# Patient Record
Sex: MALE | Race: WHITE | HISPANIC OR LATINO | Employment: FULL TIME | URBAN - METROPOLITAN AREA
[De-identification: names, ages, dates, MRNs, and addresses within clinical notes are randomized per-mention and may not be internally consistent; named-entity substitution may affect disease eponyms.]

---

## 2019-03-27 ENCOUNTER — OFFICE VISIT CONVERTED (OUTPATIENT)
Dept: UROLOGY | Facility: CLINIC | Age: 34
End: 2019-03-27
Attending: UROLOGY

## 2019-04-19 ENCOUNTER — PROCEDURE VISIT CONVERTED (OUTPATIENT)
Dept: UROLOGY | Facility: CLINIC | Age: 34
End: 2019-04-19
Attending: UROLOGY

## 2019-04-24 ENCOUNTER — OFFICE VISIT CONVERTED (OUTPATIENT)
Dept: UROLOGY | Facility: CLINIC | Age: 34
End: 2019-04-24
Attending: UROLOGY

## 2019-07-02 ENCOUNTER — HOSPITAL ENCOUNTER (OUTPATIENT)
Dept: URGENT CARE | Facility: CLINIC | Age: 34
Discharge: HOME | End: 2019-07-02

## 2019-08-21 ENCOUNTER — OFFICE VISIT CONVERTED (OUTPATIENT)
Dept: UROLOGY | Facility: CLINIC | Age: 34
End: 2019-08-21
Attending: UROLOGY

## 2019-09-11 ENCOUNTER — OFFICE VISIT CONVERTED (OUTPATIENT)
Dept: INTERNAL MEDICINE | Facility: CLINIC | Age: 34
End: 2019-09-11
Attending: NURSE PRACTITIONER

## 2019-09-11 ENCOUNTER — HOSPITAL ENCOUNTER (OUTPATIENT)
Dept: OTHER | Facility: HOSPITAL | Age: 34
Discharge: HOME | End: 2019-09-11
Attending: NURSE PRACTITIONER

## 2019-09-11 LAB
ALBUMIN SERPL-MCNC: 4.4 G/DL (ref 3.5–5)
ALBUMIN/GLOB SERPL: 1.8 {RATIO} (ref 1.4–2.6)
ALP SERPL-CCNC: 62 U/L (ref 53–128)
ALT SERPL-CCNC: 44 U/L (ref 10–40)
ANION GAP SERPL CALC-SCNC: 19 MMOL/L (ref 8–19)
AST SERPL-CCNC: 23 U/L (ref 15–50)
BASOPHILS # BLD AUTO: 0.05 10*3/UL (ref 0–0.2)
BASOPHILS NFR BLD AUTO: 0.8 % (ref 0–3)
BILIRUB SERPL-MCNC: 0.75 MG/DL (ref 0.2–1.3)
BUN SERPL-MCNC: 16 MG/DL (ref 5–25)
BUN/CREAT SERPL: 18 {RATIO} (ref 6–20)
CALCIUM SERPL-MCNC: 9.1 MG/DL (ref 8.7–10.4)
CHLORIDE SERPL-SCNC: 100 MMOL/L (ref 99–111)
CHOLEST SERPL-MCNC: 130 MG/DL (ref 107–200)
CHOLEST/HDLC SERPL: 6.2 {RATIO} (ref 3–6)
CONV ABS IMM GRAN: 0.03 10*3/UL (ref 0–0.2)
CONV CO2: 24 MMOL/L (ref 22–32)
CONV IMMATURE GRAN: 0.5 % (ref 0–1.8)
CONV TOTAL PROTEIN: 6.9 G/DL (ref 6.3–8.2)
CREAT UR-MCNC: 0.89 MG/DL (ref 0.7–1.2)
DEPRECATED RDW RBC AUTO: 38.2 FL (ref 35.1–43.9)
EOSINOPHIL # BLD AUTO: 0.08 10*3/UL (ref 0–0.7)
EOSINOPHIL # BLD AUTO: 1.3 % (ref 0–7)
ERYTHROCYTE [DISTWIDTH] IN BLOOD BY AUTOMATED COUNT: 12.2 % (ref 11.6–14.4)
EST. AVERAGE GLUCOSE BLD GHB EST-MCNC: 237 MG/DL
GFR SERPLBLD BASED ON 1.73 SQ M-ARVRAT: >60 ML/MIN/{1.73_M2}
GLOBULIN UR ELPH-MCNC: 2.5 G/DL (ref 2–3.5)
GLUCOSE SERPL-MCNC: 222 MG/DL (ref 70–99)
HBA1C MFR BLD: 9.9 % (ref 3.5–5.7)
HCT VFR BLD AUTO: 46.6 % (ref 42–52)
HDLC SERPL-MCNC: 21 MG/DL (ref 40–60)
HGB BLD-MCNC: 15.7 G/DL (ref 14–18)
LDLC SERPL CALC-MCNC: 45 MG/DL (ref 70–100)
LYMPHOCYTES # BLD AUTO: 1.56 10*3/UL (ref 1–5)
LYMPHOCYTES NFR BLD AUTO: 25.2 % (ref 20–45)
MCH RBC QN AUTO: 29 PG (ref 27–31)
MCHC RBC AUTO-ENTMCNC: 33.7 G/DL (ref 33–37)
MCV RBC AUTO: 86 FL (ref 80–96)
MONOCYTES # BLD AUTO: 0.39 10*3/UL (ref 0.2–1.2)
MONOCYTES NFR BLD AUTO: 6.3 % (ref 3–10)
NEUTROPHILS # BLD AUTO: 4.09 10*3/UL (ref 2–8)
NEUTROPHILS NFR BLD AUTO: 65.9 % (ref 30–85)
NRBC CBCN: 0 % (ref 0–0.7)
OSMOLALITY SERPL CALC.SUM OF ELEC: 296 MOSM/KG (ref 273–304)
PLATELET # BLD AUTO: 197 10*3/UL (ref 130–400)
PMV BLD AUTO: 11.7 FL (ref 9.4–12.4)
POTASSIUM SERPL-SCNC: 4.1 MMOL/L (ref 3.5–5.3)
RBC # BLD AUTO: 5.42 10*6/UL (ref 4.7–6.1)
SODIUM SERPL-SCNC: 139 MMOL/L (ref 135–147)
TRIGL SERPL-MCNC: 320 MG/DL (ref 40–150)
TSH SERPL-ACNC: 2.6 M[IU]/L (ref 0.27–4.2)
VLDLC SERPL-MCNC: 64 MG/DL (ref 5–37)
WBC # BLD AUTO: 6.2 10*3/UL (ref 4.8–10.8)

## 2020-01-21 ENCOUNTER — HOSPITAL ENCOUNTER (OUTPATIENT)
Dept: URGENT CARE | Facility: CLINIC | Age: 35
Discharge: HOME | End: 2020-01-21

## 2020-02-18 ENCOUNTER — HOSPITAL ENCOUNTER (OUTPATIENT)
Dept: URGENT CARE | Facility: CLINIC | Age: 35
Discharge: HOME | End: 2020-02-18
Attending: EMERGENCY MEDICINE

## 2020-02-20 LAB — BACTERIA SPEC AEROBE CULT: NORMAL

## 2020-04-17 ENCOUNTER — TELEMEDICINE CONVERTED (OUTPATIENT)
Dept: INTERNAL MEDICINE | Facility: CLINIC | Age: 35
End: 2020-04-17
Attending: NURSE PRACTITIONER

## 2020-08-14 ENCOUNTER — HOSPITAL ENCOUNTER (OUTPATIENT)
Dept: OTHER | Facility: HOSPITAL | Age: 35
Discharge: HOME | End: 2020-08-14
Attending: NURSE PRACTITIONER

## 2020-08-14 ENCOUNTER — OFFICE VISIT CONVERTED (OUTPATIENT)
Dept: INTERNAL MEDICINE | Facility: CLINIC | Age: 35
End: 2020-08-14
Attending: PHYSICIAN ASSISTANT

## 2020-08-14 LAB
ALBUMIN SERPL-MCNC: 4.9 G/DL (ref 3.5–5)
ALBUMIN/GLOB SERPL: 1.9 {RATIO} (ref 1.4–2.6)
ALP SERPL-CCNC: 66 U/L (ref 53–128)
ALT SERPL-CCNC: 40 U/L (ref 10–40)
ANION GAP SERPL CALC-SCNC: 22 MMOL/L (ref 8–19)
AST SERPL-CCNC: 22 U/L (ref 15–50)
BASOPHILS # BLD AUTO: 0.07 10*3/UL (ref 0–0.2)
BASOPHILS NFR BLD AUTO: 1 % (ref 0–3)
BILIRUB SERPL-MCNC: 0.84 MG/DL (ref 0.2–1.3)
BUN SERPL-MCNC: 14 MG/DL (ref 5–25)
BUN/CREAT SERPL: 13 {RATIO} (ref 6–20)
CALCIUM SERPL-MCNC: 10.4 MG/DL (ref 8.7–10.4)
CHLORIDE SERPL-SCNC: 101 MMOL/L (ref 99–111)
CHOLEST SERPL-MCNC: 152 MG/DL (ref 107–200)
CHOLEST/HDLC SERPL: 6.3 {RATIO} (ref 3–6)
CONV ABS IMM GRAN: 0.03 10*3/UL (ref 0–0.2)
CONV CO2: 23 MMOL/L (ref 22–32)
CONV IMMATURE GRAN: 0.4 % (ref 0–1.8)
CONV TOTAL PROTEIN: 7.5 G/DL (ref 6.3–8.2)
CREAT UR-MCNC: 1.08 MG/DL (ref 0.7–1.2)
DEPRECATED RDW RBC AUTO: 41.1 FL (ref 35.1–43.9)
EOSINOPHIL # BLD AUTO: 0.09 10*3/UL (ref 0–0.7)
EOSINOPHIL # BLD AUTO: 1.2 % (ref 0–7)
ERYTHROCYTE [DISTWIDTH] IN BLOOD BY AUTOMATED COUNT: 12.7 % (ref 11.6–14.4)
EST. AVERAGE GLUCOSE BLD GHB EST-MCNC: 194 MG/DL
GFR SERPLBLD BASED ON 1.73 SQ M-ARVRAT: >60 ML/MIN/{1.73_M2}
GLOBULIN UR ELPH-MCNC: 2.6 G/DL (ref 2–3.5)
GLUCOSE SERPL-MCNC: 139 MG/DL (ref 70–99)
HBA1C MFR BLD: 8.4 % (ref 3.5–5.7)
HCT VFR BLD AUTO: 53.7 % (ref 42–52)
HDLC SERPL-MCNC: 24 MG/DL (ref 40–60)
HGB BLD-MCNC: 17.7 G/DL (ref 14–18)
LDLC SERPL CALC-MCNC: 92 MG/DL (ref 70–100)
LYMPHOCYTES # BLD AUTO: 1.99 10*3/UL (ref 1–5)
LYMPHOCYTES NFR BLD AUTO: 27.1 % (ref 20–45)
MCH RBC QN AUTO: 29.3 PG (ref 27–31)
MCHC RBC AUTO-ENTMCNC: 33 G/DL (ref 33–37)
MCV RBC AUTO: 88.8 FL (ref 80–96)
MONOCYTES # BLD AUTO: 0.43 10*3/UL (ref 0.2–1.2)
MONOCYTES NFR BLD AUTO: 5.9 % (ref 3–10)
NEUTROPHILS # BLD AUTO: 4.74 10*3/UL (ref 2–8)
NEUTROPHILS NFR BLD AUTO: 64.4 % (ref 30–85)
NRBC CBCN: 0 % (ref 0–0.7)
OSMOLALITY SERPL CALC.SUM OF ELEC: 295 MOSM/KG (ref 273–304)
PLATELET # BLD AUTO: 224 10*3/UL (ref 130–400)
PMV BLD AUTO: 11.7 FL (ref 9.4–12.4)
POTASSIUM SERPL-SCNC: 4.8 MMOL/L (ref 3.5–5.3)
RBC # BLD AUTO: 6.05 10*6/UL (ref 4.7–6.1)
SODIUM SERPL-SCNC: 141 MMOL/L (ref 135–147)
TRIGL SERPL-MCNC: 178 MG/DL (ref 40–150)
VLDLC SERPL-MCNC: 36 MG/DL (ref 5–37)
WBC # BLD AUTO: 7.35 10*3/UL (ref 4.8–10.8)

## 2021-03-30 ENCOUNTER — HOSPITAL ENCOUNTER (OUTPATIENT)
Dept: OTHER | Facility: HOSPITAL | Age: 36
Discharge: HOME | End: 2021-03-30
Attending: NURSE PRACTITIONER

## 2021-03-30 ENCOUNTER — CONVERSION ENCOUNTER (OUTPATIENT)
Dept: INTERNAL MEDICINE | Facility: CLINIC | Age: 36
End: 2021-03-30

## 2021-03-30 ENCOUNTER — OFFICE VISIT CONVERTED (OUTPATIENT)
Dept: INTERNAL MEDICINE | Facility: CLINIC | Age: 36
End: 2021-03-30
Attending: NURSE PRACTITIONER

## 2021-03-30 LAB
ALBUMIN SERPL-MCNC: 4.6 G/DL (ref 3.5–5)
ALBUMIN/GLOB SERPL: 1.6 {RATIO} (ref 1.4–2.6)
ALP SERPL-CCNC: 76 U/L (ref 53–128)
ALT SERPL-CCNC: 61 U/L (ref 10–40)
ANION GAP SERPL CALC-SCNC: 19 MMOL/L (ref 8–19)
AST SERPL-CCNC: 28 U/L (ref 15–50)
BASOPHILS # BLD AUTO: 0.08 10*3/UL (ref 0–0.2)
BASOPHILS NFR BLD AUTO: 1.1 % (ref 0–3)
BILIRUB SERPL-MCNC: 0.79 MG/DL (ref 0.2–1.3)
BUN SERPL-MCNC: 11 MG/DL (ref 5–25)
BUN/CREAT SERPL: 12 {RATIO} (ref 6–20)
CALCIUM SERPL-MCNC: 9.6 MG/DL (ref 8.7–10.4)
CHLORIDE SERPL-SCNC: 97 MMOL/L (ref 99–111)
CHOLEST SERPL-MCNC: 175 MG/DL (ref 107–200)
CHOLEST/HDLC SERPL: 7.3 {RATIO} (ref 3–6)
CONV ABS IMM GRAN: 0.04 10*3/UL (ref 0–0.2)
CONV CO2: 25 MMOL/L (ref 22–32)
CONV IMMATURE GRAN: 0.6 % (ref 0–1.8)
CONV TOTAL PROTEIN: 7.5 G/DL (ref 6.3–8.2)
CREAT UR-MCNC: 0.9 MG/DL (ref 0.7–1.2)
DEPRECATED RDW RBC AUTO: 40 FL (ref 35.1–43.9)
EOSINOPHIL # BLD AUTO: 0.07 10*3/UL (ref 0–0.7)
EOSINOPHIL # BLD AUTO: 1 % (ref 0–7)
ERYTHROCYTE [DISTWIDTH] IN BLOOD BY AUTOMATED COUNT: 12.8 % (ref 11.6–14.4)
EST. AVERAGE GLUCOSE BLD GHB EST-MCNC: 232 MG/DL
GFR SERPLBLD BASED ON 1.73 SQ M-ARVRAT: >60 ML/MIN/{1.73_M2}
GLOBULIN UR ELPH-MCNC: 2.9 G/DL (ref 2–3.5)
GLUCOSE SERPL-MCNC: 227 MG/DL (ref 70–99)
HBA1C MFR BLD: 9.7 % (ref 3.5–5.7)
HCT VFR BLD AUTO: 48.3 % (ref 42–52)
HDLC SERPL-MCNC: 24 MG/DL (ref 40–60)
HGB BLD-MCNC: 16.3 G/DL (ref 14–18)
LDLC SERPL CALC-MCNC: 89 MG/DL (ref 70–100)
LYMPHOCYTES # BLD AUTO: 1.8 10*3/UL (ref 1–5)
LYMPHOCYTES NFR BLD AUTO: 25.8 % (ref 20–45)
MCH RBC QN AUTO: 29.2 PG (ref 27–31)
MCHC RBC AUTO-ENTMCNC: 33.7 G/DL (ref 33–37)
MCV RBC AUTO: 86.4 FL (ref 80–96)
MONOCYTES # BLD AUTO: 0.41 10*3/UL (ref 0.2–1.2)
MONOCYTES NFR BLD AUTO: 5.9 % (ref 3–10)
NEUTROPHILS # BLD AUTO: 4.58 10*3/UL (ref 2–8)
NEUTROPHILS NFR BLD AUTO: 65.6 % (ref 30–85)
NRBC CBCN: 0 % (ref 0–0.7)
OSMOLALITY SERPL CALC.SUM OF ELEC: 291 MOSM/KG (ref 273–304)
PLATELET # BLD AUTO: 222 10*3/UL (ref 130–400)
PMV BLD AUTO: 11.9 FL (ref 9.4–12.4)
POTASSIUM SERPL-SCNC: 4.2 MMOL/L (ref 3.5–5.3)
RBC # BLD AUTO: 5.59 10*6/UL (ref 4.7–6.1)
SODIUM SERPL-SCNC: 137 MMOL/L (ref 135–147)
T4 FREE SERPL-MCNC: 1.4 NG/DL (ref 0.9–1.8)
TRIGL SERPL-MCNC: 452 MG/DL (ref 40–150)
TSH SERPL-ACNC: 2.29 M[IU]/L (ref 0.27–4.2)
WBC # BLD AUTO: 6.98 10*3/UL (ref 4.8–10.8)

## 2021-03-31 LAB
CONV CREATININE URINE, RANDOM: 216.1 MG/DL (ref 10–300)
CONV MICROALBUM.,U,RANDOM: 62.6 MG/L (ref 0–20)
MICROALBUMIN/CREAT UR: 29 MG/G{CRE} (ref 0–25)

## 2021-05-12 NOTE — PROGRESS NOTES
Progress Note      Patient Name: Matt Albarran   Patient ID: [id number]   Sex: Male   YOB: 1985    Primary Care Provider: Tierra BALLARD   Referring Provider: Tierra BALLARD    Visit Date: April 17, 2020    Provider: ELIO Berger   Location: [facility]   Location Address: [address] [place] [address], [address]  [place], [place]  [id number]   Location Phone: [phone]          History Of Present Illness  Video Conferencing Visit  Matt Albarran is a 34 year old /White,  or  male who is presenting for evaluation via video conferencing. Verbal consent obtained before beginning visit.   The following staff were present during this visit: Jose M Soriano MA; ELIO Berger   Matt Albarran is a 34 year old /White,  or  male who presents for evaluation and treatment of:      Informed patient that as visit is being performed as a video conference there will be no opportunity to obtain vital signs or perform a thorough physical exam. Due to this there is unfortunately a possibility that things may be missed that would typically be noticed during a traditional visit. Patient is aware of this possibility and agrees to proceed with the video conference. Patient states there is no other person present for this video conference. Call via Zoom.    Video conference started: 0916  Video conference ended: 0926    DM2-  Patient is currently managing with metformin 1000 mg once daily and Jardiance. HgbA1c 9.2. He has not checked his blood glucose levels in some time. Patient did not follow up for two week visit as scheduled 9/2019. Reports previous blood glucose readings averaging around 130, however he has not been checking in some time. Denies lows. Urine microalbumin: 10/2019. Diabetic foot exam: Due. Diabetic eye exam: Due.     HLD-  Managed with statin, well tolerated. Denies leg cramping. Most recent LDL 45.      HTN-  Managed with Lisinopril. Reports home blood pressure readings 130s/80s. Denies chest pain, blurry vision, headache, leg swelling.       Past Medical History  Disease Name Date Onset Notes   Diabetes --  --    High blood pressure --  --    High cholesterol --  --          Past Surgical History  Procedure Name Date Notes   Vasectomy 2019 --          Medication List  Name Date Started Instructions   atorvastatin 40 mg oral tablet 03/13/2020 TAKE 1 TABLET BY MOUTH ONCE DAILY FOR 90 DAYS   blood glucose meter 09/12/2019 use as directed to check blood sugar when fasting, 2 hours before meals and after each meal.   GenStrip Test Strip miscellaneous strip 09/12/2019 use as directed to check blood sugar fasting, 2 hours before meals and after each meal   Jardiance 10 mg oral tablet 03/13/2020 TAKE 1 TABLET BY MOUTH ONCE DAILY IN THE MORNING   lancets 21 gauge miscellaneous misc 09/12/2019 use as directed to check blood sugar   lisinopril 20 mg oral tablet 03/13/2020 TAKE 1 TABLET BY MOUTH ONCE DAILY FOR 90 DAYS   metformin 1,000 mg oral tablet 09/12/2019 take 1 tablet (1,000 mg) by oral route 2 times per day with morning and evening meals for 90 days   True Metrix Glucose Test Strip miscellaneous strip 09/17/2019 use as directed         Allergy List  Allergen Name Date Reaction Notes   NO KNOWN DRUG ALLERGIES --  --  --          Family Medical History  Disease Name Relative/Age Notes   Diabetes, unspecified type Father/  Grandmother (paternal)/   Father; Grandmother (paternal)         Social History  Finding Status Start/Stop Quantity Notes   Alcohol Former --/-- --  drinks in the past   Caffeine Current every day --/-- --  drinks regularly; coffee; 1-2 times per day   Second hand smoke exposure Never --/-- --  no   Tobacco Never --/-- --  --          Review of Systems  · Constitutional  o Denies  o : fever, fatigue, weight loss, weight gain  · Psychiatric  o Denies  o : anxiety, depression, suicidal ideation,  homicidal ideation      Physical Examination     General: Well nourished, no acute distress  HENT: Atraumatic, normocephalic  Eyes: Extraocular movements intact, no scleral icterus  Lungs: Breathing comfortably, without cough  Integumentary: No visible rash or lesion  Neurologic: Grossly oriented to person, place, time; without facial droop  Psych: Normal mood and affect               Assessment  · Diabetes mellitus, type 2     250.00/E11.9  Patient has not been seen in clinic x 7 months, discussed the importance of making it to follow up appointments. Will continue Jardiance and metformin at this time, however will likely need dosage increases on both. Labs ordered, patient to come to clinic early next week to have labs drawn. Will determine medication adjustments based on results of labs. Patient to monitor blood glucose levels at home and call if consistently elevated/low. Diabetic foot exam and urine microalbumin due at next in clinic appointment.   · Essential hypertension     401.9/I10  Well controlled, continue Lisinopril. Encouraged patient to continue to monitor blood pressure at home and to call or return to clinic with consistent elevations. Labs ordered.  · Hyperlipidemia     272.4/E78.5  Well controlled on previous labs, continue statin. Lipid panel ordered.    Problems Reconciled  Plan  · Orders  o CBC with Auto Diff [facility] ([id number]) - 250.00/E11.9 - 04/17/2020  o CMP [facility] ([id number]) - 250.00/E11.9 - 04/17/2020  o Hgb A1c [facility] ([id number]) - 250.00/E11.9 - 04/17/2020  o Lipid Panel [facility] ([id number]) - 250.00/E11.9 - 04/17/2020  o Urine microalbumin ([id number]) - 250.00/E11.9 - 04/17/2020  o ACO-39: Current medications updated and reviewed () - - 04/17/2020  · Medications  o Medications have been Reconciled  o Transition of Care or Provider Policy  · Instructions  o Continue blood sugar monitoring daily and record. Bring your log to office visits. Call the office for readings below 70 and above 250 or any  complications.  o Daily foot care. Avoid walking barefoot. Annual Dilated Eye Exam.  o Discussed with patient blood pressure monitoring, hemoglobin A1C levels need to be below 7.0, and LDL (Lipid) goals below 70.  o Patient advised to monitor blood pressure (B/P) at home and journal readings. Patient informed that a B/P reading at home of more than 130/80 is considered hypertension. For readings greater dron862/90 or higher patient is advised to follow up in the office with readings for management. Patient advised to limit sodium intake.  o Advised that cheeses and other sources of dairy fats, animal fats, fast food, and the extras (candy, pastries, pies, doughnuts and cookies) all contain LDL raising nutrients. Advised to increase fruits, vegetables, whole grains, and to monitor portion sizes.   o Take all medications as prescribed/directed.  o Patient was educated/instructed on their diagnosis, treatment and medications prior to discharge from the clinic today.  o Patient instructed to seek medical attention urgently for new or worsening symptoms.  o Call the office with any concerns or questions.  · Disposition  o Call or Return if symptoms worsen or persist.  o Follow up in 3 months  o Prescriptions sent to pharmacy  o Will call patient with results of labs            Electronically Signed by: ELIO Berger -Author on April 17, 2020 12:12:41 PM

## 2021-05-13 NOTE — PROGRESS NOTES
"   Progress Note      Patient Name: Matt Albarran   Patient ID: [id number]   Sex: Male   YOB: 1985    Primary Care Provider: Tierra BALLARD   Referring Provider: Tierra BALLARD    Visit Date: August 14, 2020    Provider: Corrine Okeefe PA-C   Location: [facility]   Location Address: [address] [place] [address] [address]  [place], [place]  [id number]   Location Phone: [phone]          Chief Complaint  · Knee pain      History Of Present Illness  Matt Albarran is a 34 year old /White,  or  male who presents for evaluation and treatment of:      left knee pain. Patient states this started on Monday. He came home from work and got up. He states when he started walking it started hurting. He states he feels it more when he is walking up and downstairs and when walking for prolonged periods of time. 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 He states he walks a lot at work, but did not hurt his knee at work that day. He states he has no history of prior knee injury or knee pain. He states he has not exercised for the past few months since COVID started. He has not been seen for this before. He states he went to work all week and had restricted ROM. He states his pain and ROM is gradually improving. He states he can point to exactly where the pain is. He states when he is walking he notices some weakness and \"giving\" while walking. He states he has tried a neoprene sleeve, Tylenol and icing to alleviate his symptoms. He states he has not found any of these things to work. Denies edema. Denies redness and ecchymosis. Denies numbness and tingling. Denies instability. Denies left hip pain. Denies left ankle pain.       Past Medical History  Disease Name Date Onset Notes   Diabetes --  --    High blood pressure --  --    High cholesterol --  --          Past Surgical History  Procedure Name Date Notes   Vasectomy 2019 --          Medication List  Name Date " Started Instructions   atorvastatin 40 mg oral tablet 06/18/2020 TAKE 1 TABLET BY MOUTH ONCE DAILY FOR 90 DAYS   blood glucose meter 09/12/2019 use as directed to check blood sugar when fasting, 2 hours before meals and after each meal.   GenStrip Test Strip miscellaneous strip 08/14/2020 use as directed to check blood sugar fasting, 2 hours before meals and after each meal   Jardiance 10 mg oral tablet 06/17/2020 TAKE 1 TABLET BY MOUTH ONCE DAILY IN THE MORNING   lancets 21 gauge miscellaneous misc 08/14/2020 use as directed to check blood sugar   lisinopril 20 mg oral tablet 06/18/2020 TAKE 1 TABLET BY MOUTH ONCE DAILY FOR 90 DAYS   metformin 1,000 mg oral tablet 09/12/2019 take 1 tablet (1,000 mg) by oral route 2 times per day with morning and evening meals for 90 days   True Metrix Glucose Test Strip miscellaneous strip 09/17/2019 use as directed         Allergy List  Allergen Name Date Reaction Notes   NO KNOWN DRUG ALLERGIES --  --  --        Allergies Reconciled  Family Medical History  Disease Name Relative/Age Notes   Diabetes, unspecified type Father/  Grandmother (paternal)/   Father; Grandmother (paternal)         Social History  Finding Status Start/Stop Quantity Notes   Alcohol Former --/-- --  drinks in the past   Caffeine Current every day --/-- --  drinks regularly; coffee; 1-2 times per day   Second hand smoke exposure Never --/-- --  no   Tobacco Never --/-- --  --          Review of Systems  · Constitutional  o Denies  o : fever, fatigue  · Cardiovascular  o Denies  o : chest pain, palpitations  · Respiratory  o Denies  o : shortness of breath, cough  · Gastrointestinal  o Denies  o : nausea, vomiting, diarrhea, constipation, abdominal pain  · Neurologic  o Denies  o : dizziness, loss of balance  · Musculoskeletal  o Admits  o : limitation of motion, knee pain  o Denies  o : joint swelling, muscle pain, muscular weakness, back pain, hip pain, ankle pain      Vitals  Date Time BP Position Site L\R  Cuff Size HR RR TEMP (F) WT  HT  BMI kg/m2 BSA m2 O2 Sat HC       08/21/2019 08:12 AM       16  260lbs 0oz 6'   35.26 2.45     09/11/2019 02:35 /82 Sitting    74 - R  97.5 247lbs 6oz 6'   33.55 2.39 100 %    08/14/2020 01:29 /88 Sitting    81 - R  96.6 255lbs 0oz 6'   34.58 2.42 98 %          Physical Examination  · Constitutional  o Appearance  o : no acute distress, well-nourished  · Head and Face  o Head  o :   § Inspection  § : atraumatic, normocephalic  · Ears, Nose, Mouth and Throat  o Ears  o :   § External Ears  § : normal  o Nose  o :   § Intranasal Exam  § : nares patent  o Oral Cavity  o :   § Oral Mucosa  § : moist mucous membranes  · Respiratory  o Respiratory Effort  o : breathing comfortably, symmetric chest rise  o Auscultation of Lungs  o : clear to asculatation bilaterally, no wheezes, rales, or rhonchii  · Cardiovascular  o Heart  o :   § Auscultation of Heart  § : regular rate and rhythm, no murmurs, rubs, or gallops  o Peripheral Vascular System  o :   § Extremities  § : no edema  · Neurologic  o Mental Status Examination  o :   § Orientation  § : grossly oriented to person, place and time  o Gait and Station  o :   § Gait Screening  § : normal gait     MSK: pinpoint pain on medial knee, pain with full extension of left knee, pain with flexion of left knee               Assessment  · Diabetes     250.92  Patient had updated labs drawn this AM. Will review lab results at follow-up appt in 1wk.  · High blood pressure     401.9/I10  · High cholesterol     272.0/E78.0  · Left knee pain     719.46/M25.562  Discussed symptoms with patient. Encouraged patient to use crutches for the next week and stay non wgt bearing, ice his knee and to take Naproxen BID for his knee pain. Encouraged patient to follow-up in 1wk when he comes to review his lab work. If his pain has not improved by that time, will order an XR of his left knee. Patient understood and agreed with plan.  · Knee  jenny     844.9/S83.90XA    Problems Reconciled  Plan  · Orders  o ACO-39: Current medications updated and reviewed () - - 08/14/2020  · Medications  o naproxen 500 mg oral tablet   SIG: take 1 tablet (500 mg) by oral route 2 times per day with food   DISP: (60) tablets with 0 refills  Prescribed on 08/14/2020     o GenStrip Test Strip miscellaneous strip   SIG: use as directed to check blood sugar fasting, 2 hours before meals and after each meal   DISP: (2) 50 ct box with 2 refills  Adjusted on 08/14/2020     o lancets 21 gauge miscellaneous misc   SIG: use as directed to check blood sugar   DISP: (1) 100 ct box with 2 refills  Adjusted on 08/14/2020     o Medications have been Reconciled  o Transition of Care or Provider Policy  · Instructions  o Take all medications as prescribed/directed.  o Patient was educated/instructed on their diagnosis, treatment and medications prior to discharge from the clinic today.  o Call the office with any concerns or questions.  · Disposition  o Call or Return if symptoms worsen or persist.  o Follow up in 1 week  o Encouraged to make follow up appointment for check up and blood work            Electronically Signed by: Corrine Okeefe PA-C -Author on August 15, 2020 07:57:51 AM

## 2021-05-14 VITALS
WEIGHT: 256.37 LBS | HEIGHT: 72 IN | SYSTOLIC BLOOD PRESSURE: 136 MMHG | HEART RATE: 84 BPM | DIASTOLIC BLOOD PRESSURE: 98 MMHG | BODY MASS INDEX: 34.72 KG/M2 | TEMPERATURE: 97.4 F | OXYGEN SATURATION: 99 %

## 2021-05-14 NOTE — PROGRESS NOTES
Progress Note      Patient Name: Matt Albarran   Patient ID: [id number]   Sex: Male   YOB: 1985    Primary Care Provider: Tierra BALLARD   Referring Provider: Tierra BALLARD    Visit Date: March 30, 2021    Provider: ELIO Berger   Location: [facility]   Location Address: [address] [place] [address], [address]  [place], [place]  [id number]   Location Phone: [phone]          Chief Complaint  · Follow-up  · Diabetes mellitus, type 2       History Of Present Illness  Matt Albarran is a 35 year old /White,  or  male who presents for evaluation and treatment of:      DM2-  Currently managed with metformin BID. Patient stopped Jardiance approximately 2 months ago because insurance stopped covering it, was on x 1 year. Most recent HgbA1c 8.4, metformin and Jardiance were increased at that time. He does not check his blood glucose levels at home. Urine microalbumin: 10/2019. Diabetic foot exam: Due. Diabetic eye exam: Never. Pneumonia vaccination: Will consider. Renal protection: ACE.    HTN-  Managed with Lisinopril, he has not taken medication yet today. Reports home readings 120s/80s. Denies chest pain, blurry vision, headache, leg swelling.    HLD-  Patient has been out of statin x 1 month. Previously well tolerated, denies leg cramping. Most recent LDL 92, medication was adjusted at that time.    Lumbago-  Patient reports chronic low back pain. Had an exacerbation a few weeks ago that was sharp, shooting and radiated into the right thigh. This has since resolved, patient was just worried it may be something serious. Denies numbness/tingling, weakness, loss of bowel/bladder function.     Anxiety-  Patient states he feels like he has anxiety from stress at work. Has a history of depression as a teenager but no longer struggles with this. Denies SI/HI. He is not currently interested in medication but would be interested in therapy.     Denies family  history of prostate or colon cancer.       Past Medical History  Disease Name Date Onset Notes   Diabetes --  --    High blood pressure --  --    High cholesterol --  --          Past Surgical History  Procedure Name Date Notes   Vasectomy 2019 --          Medication List  Name Date Started Instructions   atorvastatin 40 mg oral tablet 03/30/2021 TAKE 1 TABLET BY MOUTH ONCE DAILY FOR 90 DAYS for 90 days   blood glucose meter 09/12/2019 use as directed to check blood sugar when fasting, 2 hours before meals and after each meal.   GenStrip Test Strip miscellaneous strip 08/14/2020 use as directed to check blood sugar fasting, 2 hours before meals and after each meal   lancets 21 gauge miscellaneous misc 08/14/2020 use as directed to check blood sugar   lisinopril 20 mg oral tablet 03/23/2021 TAKE 1 TABLET BY MOUTH ONCE DAILY FOR 90 DAYS   metformin 1,000 mg oral tablet 03/23/2021 take 1 tablet (1,000 mg) by oral route 2 times per day with morning and evening meals for 90 days   True Metrix Glucose Test Strip miscellaneous strip 09/17/2019 use as directed         Allergy List  Allergen Name Date Reaction Notes   NO KNOWN DRUG ALLERGIES --  --  --        Allergies Reconciled  Family Medical History  Disease Name Relative/Age Notes   Diabetes, unspecified type Father/  Grandmother (paternal)/   Father; Grandmother (paternal)         Social History  Finding Status Start/Stop Quantity Notes   Alcohol Former --/-- --  drinks in the past   Caffeine Current every day --/-- --  drinks regularly; coffee; 1-2 times per day   Second hand smoke exposure Never --/-- --  no   Tobacco Never --/-- --  --          Review of Systems  · Constitutional  o Denies  o : fever, fatigue, weight loss, weight gain  · Eyes  o Denies  o : discharge from eye, impaired vision, blurred vision  · HENT  o Denies  o : headaches, vertigo, lightheadedness  · Cardiovascular  o Denies  o : lower extremity edema, chest pressure,  palpitations  · Respiratory  o Denies  o : shortness of breath, wheezing, cough, dyspnea on exertion  · Gastrointestinal  o Denies  o : nausea, vomiting, diarrhea, constipation, abdominal pain  · Neurologic  o Denies  o : altered mental status, muscular weakness, tingling or numbness  · Musculoskeletal  o Admits  o : joint pain, back pain  o Denies  o : limitation of motion      Vitals  Date Time BP Position Site L\R Cuff Size HR RR TEMP (F) WT  HT  BMI kg/m2 BSA m2 O2 Sat FR L/min FiO2 HC       09/11/2019 02:35 /82 Sitting    74 - R  97.5 247lbs 6oz 6'   33.55 2.39 100 %      08/14/2020 01:29 /88 Sitting    81 - R  96.6 255lbs 0oz 6'   34.58 2.42 98 %  21%    03/30/2021 09:17 /98 Sitting    84 - R  97.4 256lbs 6oz 6'   34.77 2.43 99 %  21%          Physical Examination  · Constitutional  o Appearance  o : no acute distress, well-nourished  · Head and Face  o Head  o :   § Inspection  § : atraumatic, normocephalic  · Ears, Nose, Mouth and Throat  o Ears  o :   § External Ears  § : normal  o Nose  o :   § Intranasal Exam  § : nares patent  o Oral Cavity  o :   § Oral Mucosa  § : moist mucous membranes  · Neck  o Thyroid  o : gland size normal, nontender, no nodules or masses present on palpation, symmetric  · Respiratory  o Respiratory Effort  o : breathing comfortably, symmetric chest rise  o Auscultation of Lungs  o : clear to asculatation bilaterally, no wheezes, rales, or rhonchii  · Cardiovascular  o Heart  o :   § Auscultation of Heart  § : regular rate and rhythm, no murmurs, rubs, or gallops  o Peripheral Vascular System  o :   § Extremities  § : no edema  · Skin and Subcutaneous Tissue  o General Inspection  o : no lesions present, no areas of discoloration, skin turgor normal  · Neurologic  o Mental Status Examination  o :   § Orientation  § : grossly oriented to person, place and time  o Gait and Station  o :   § Gait Screening  § : normal gait      Figure 1.0: Diabetic Foot Screen              Assessment  · Screening for depression     V79.0/Z13.89  PHQ9 score of 9. Patient denies depression, admits anxiety.  · Diabetes mellitus, type 2     250.00/E11.9  Continue metformin. Will contact pharmacy to discuss Jardiance, could consider switching to Farxiga. Most recent HgbA1c 8.4, medications were adjusted at that time. Encouraged patient to monitor blood glucose levels and to call with consistent elevations. Labs in clinic today, will adjust medications based on results. Urine microalbumin: Today. Diabetic foot exam: Today. Diabetic eye exam: Patient to schedule. Pneumonia vaccination: Will consider. Renal protection: ACE.  · Essential hypertension     401.9/I10  Elevation in clinic, discussed with patient the importance of taking medication as prescribed and risks of uncontrolled hypertension. Continue Lisinopril. Encouraged patient to continue to monitor blood pressure at home and to call with consistent elevations greater than 130s/80s. Labs in clinic today. Will continue to monitor.   · Hyperlipidemia     272.4/E78.5  Most recent LDL 92, statin started at that time. Repeat lipid panel in clinic today.   · Lumbago     724.2/M54.5  Chronic with exacerbations, patient without acute episode. He will call or return to clinic if symptoms worsen or persist. Could consider imaging and/or medication management if warranted. He will seek medical attention immediately with severe/persistent pain, weakness, numbness/tingling, loss of bowel/bladder function. Will continue to monitor.   · Anxiety     300.00/F41.9  Will provide patient with local therapy list. He will continue to monitor and seek medical attention immediately if he feels that his mental health is deteriorating. Denies SI/HI. Could consider medication management in the future if warranted.     Problems Reconciled  Plan  · Orders  o CBC with Auto Diff [facility] (63371) - 250.00/E11.9 - 03/30/2021  o CMP [facility] (42574) - 250.00/E11.9 - 03/30/2021  o Hgb  A1c [facility] (65706) - 250.00/E11.9 - 03/30/2021  o Lipid Panel [facility] (43438) - 250.00/E11.9 - 03/30/2021  o Urine microalbumin (56330) - 250.00/E11.9 - 03/30/2021  o Thyroid Profile (THYII, 83746, 46388) - 250.00/E11.9 - 03/30/2021  o Diabetic Foot (Motor and Sensory) Exam Completed [facility] (, , 2028F) - 250.00/E11.9 - 03/30/2021  o ACO-18: Positive screen for clinical depression using a standardized tool and a follow-up plan documented () - - 03/30/2021  o ACO-39: Current medications updated and reviewed (1159F, ) - - 03/30/2021  · Medications  o Medications have been Reconciled  o Transition of Care or Provider Policy  · Instructions  o Depression Screen completed and scanned into the EMR under the designated folder within the patient's documents.  o Today's PHQ-9 result is 9  o Continue blood sugar monitoring daily and record. Bring your log to office visits. Call the office for readings below 70 and above 250 or any complications.  o Daily foot care. Avoid walking barefoot. Annual Dilated Eye Exam.  o Discussed with patient blood pressure monitoring, hemoglobin A1C levels need to be below 7.0, and LDL (Lipid) goals below 70.  o Patient advised to monitor blood pressure (B/P) at home and journal readings. Patient informed that a B/P reading at home of more than 130/80 is considered hypertension. For readings greater thtp537/90 or higher patient is advised to follow up in the office with readings for management. Patient advised to limit sodium intake.  o Advised that cheeses and other sources of dairy fats, animal fats, fast food, and the extras (candy, pastries, pies, doughnuts and cookies) all contain LDL raising nutrients. Advised to increase fruits, vegetables, whole grains, and to monitor portion sizes.   o Take all medications as prescribed/directed.  o Patient was educated/instructed on their diagnosis, treatment and medications prior to discharge from the clinic today.  o Patient instructed to  seek medical attention urgently for new or worsening symptoms.  o Call the office with any concerns or questions.  o Chronic conditions reviewed and taken into consideration for today's treatment plan.  · Disposition  o Call or Return if symptoms worsen or persist.  o Prescriptions sent to pharmacy  o Labs drawn in clinic  o Will call patient with results of labs  o Return Visit Request in/on 5 months +/- 2 days ([id number]).            Electronically Signed by: ELIO Berger -Author on March 30, 2021 11:03:42 AM

## 2021-05-15 VITALS
HEART RATE: 74 BPM | HEIGHT: 72 IN | TEMPERATURE: 97.5 F | OXYGEN SATURATION: 100 % | BODY MASS INDEX: 33.51 KG/M2 | SYSTOLIC BLOOD PRESSURE: 122 MMHG | WEIGHT: 247.37 LBS | DIASTOLIC BLOOD PRESSURE: 82 MMHG

## 2021-05-15 VITALS
OXYGEN SATURATION: 98 % | TEMPERATURE: 96.6 F | BODY MASS INDEX: 34.54 KG/M2 | HEIGHT: 72 IN | WEIGHT: 255 LBS | DIASTOLIC BLOOD PRESSURE: 88 MMHG | HEART RATE: 81 BPM | SYSTOLIC BLOOD PRESSURE: 138 MMHG

## 2021-05-15 VITALS — RESPIRATION RATE: 16 BRPM | WEIGHT: 265 LBS | BODY MASS INDEX: 35.89 KG/M2 | HEIGHT: 72 IN

## 2021-05-15 VITALS — RESPIRATION RATE: 16 BRPM | BODY MASS INDEX: 35.21 KG/M2 | HEIGHT: 72 IN | WEIGHT: 260 LBS

## 2021-05-15 VITALS — BODY MASS INDEX: 35.89 KG/M2 | HEIGHT: 72 IN | WEIGHT: 265 LBS | RESPIRATION RATE: 17 BRPM

## 2021-07-08 NOTE — TELEPHONE ENCOUNTER
Caller: NITESH FERRARO     Relationship: Spouse    Best call back number: [phone]    Medication needed: LISINOPRIL 20 MG   HUB WAS UNABLE TO REVIEW THE MEDICATION LIST  Requested Prescriptions      No prescriptions requested or ordered in this encounter       When do you need the refill by:ASAP    What additional details did the patient provide when requesting the medication: PATIENT IS COMPLETELY OUT    Does the patient have less than a 3 day supply:  [x] Yes  [] No    What is the patient's preferred pharmacy:    [facility]  [address] [place] [id number]  PHONE [phone]

## 2021-07-09 RX ORDER — LISINOPRIL 20 MG/1
20 TABLET ORAL DAILY
Qty: 90 TABLET | Refills: 0 | Status: SHIPPED | OUTPATIENT
Start: 2021-07-09 | End: 2021-07-09 | Stop reason: SDUPTHER

## 2021-07-09 RX ORDER — LISINOPRIL 20 MG/1
TABLET ORAL
COMMUNITY
Start: 2021-03-23 | End: 2021-07-09 | Stop reason: SDUPTHER

## 2021-07-09 RX ORDER — ATORVASTATIN CALCIUM 40 MG/1
TABLET, FILM COATED ORAL
COMMUNITY
Start: 2021-03-30 | End: 2021-10-11 | Stop reason: SDUPTHER

## 2021-07-09 RX ORDER — LISINOPRIL 20 MG/1
20 TABLET ORAL DAILY
Qty: 90 TABLET | Refills: 0 | Status: SHIPPED | OUTPATIENT
Start: 2021-07-09 | End: 2021-10-11 | Stop reason: SDUPTHER

## 2021-07-17 ENCOUNTER — APPOINTMENT (OUTPATIENT)
Dept: GENERAL RADIOLOGY | Facility: HOSPITAL | Age: 36
End: 2021-07-17

## 2021-07-17 ENCOUNTER — HOSPITAL ENCOUNTER (EMERGENCY)
Facility: HOSPITAL | Age: 36
Discharge: HOME | End: 2021-07-17
Attending: EMERGENCY MEDICINE | Admitting: EMERGENCY MEDICINE

## 2021-07-17 VITALS
SYSTOLIC BLOOD PRESSURE: 134 MMHG | RESPIRATION RATE: 20 BRPM | TEMPERATURE: 98 F | HEART RATE: 81 BPM | BODY MASS INDEX: 33.41 KG/M2 | DIASTOLIC BLOOD PRESSURE: 79 MMHG | HEIGHT: 72 IN | WEIGHT: 246.69 LBS | OXYGEN SATURATION: 98 %

## 2021-07-17 DIAGNOSIS — M77.8 LEFT ELBOW TENDONITIS: Primary | ICD-10-CM

## 2021-07-17 PROCEDURE — 73080 X-RAY EXAM OF ELBOW: CPT

## 2021-07-17 PROCEDURE — 96372 THER/PROPH/DIAG INJ SC/IM: CPT

## 2021-07-17 PROCEDURE — 99282 EMERGENCY DEPT VISIT SF MDM: CPT

## 2021-07-17 PROCEDURE — 25010000002 KETOROLAC TROMETHAMINE PER 15 MG: Performed by: NURSE PRACTITIONER

## 2021-07-17 RX ORDER — KETOROLAC TROMETHAMINE 10 MG/1
10 TABLET, FILM COATED ORAL EVERY 6 HOURS PRN
Qty: 15 TABLET | Refills: 0 | OUTPATIENT
Start: 2021-07-17 | End: 2021-12-20

## 2021-07-17 RX ORDER — KETOROLAC TROMETHAMINE 30 MG/ML
30 INJECTION, SOLUTION INTRAMUSCULAR; INTRAVENOUS ONCE
Status: COMPLETED | OUTPATIENT
Start: 2021-07-17 | End: 2021-07-17

## 2021-07-17 RX ADMIN — KETOROLAC TROMETHAMINE 30 MG: 30 INJECTION, SOLUTION INTRAMUSCULAR; INTRAVENOUS at 10:18

## 2021-07-17 NOTE — ED PROVIDER NOTES
"Patient is 35 y.o. year old male that presents to the ED for evaluation of left elbow pain. Patient denies a fall. He states last month he was playing catch with his daughter and he noticed some discomfort. He states it only hurts when he is lifting and gripping objects.     Physical Exam  Vitals and nursing note reviewed.   Constitutional:       General: He is not in acute distress.     Appearance: Normal appearance. He is not toxic-appearing.   HENT:      Head: Normocephalic and atraumatic.      Jaw: There is normal jaw occlusion.   Eyes:      General: Lids are normal.      Extraocular Movements: Extraocular movements intact.      Conjunctiva/sclera: Conjunctivae normal.      Pupils: Pupils are equal, round, and reactive to light.   Cardiovascular:      Rate and Rhythm: Normal rate and regular rhythm.      Pulses: Normal pulses.      Heart sounds: Normal heart sounds.   Pulmonary:      Effort: Pulmonary effort is normal. No respiratory distress.      Breath sounds: Normal breath sounds. No wheezing or rhonchi.   Abdominal:      General: Abdomen is flat.      Palpations: Abdomen is soft.      Tenderness: There is no abdominal tenderness. There is no guarding or rebound.   Musculoskeletal:         General: Normal range of motion.      Cervical back: Normal range of motion and neck supple.      Right lower leg: No edema.      Left lower leg: No edema.      Comments: Pain with pronation of left elbow   Skin:     General: Skin is warm and dry.   Neurological:      Mental Status: He is alert and oriented to person, place, and time. Mental status is at baseline.   Psychiatric:         Mood and Affect: Mood normal.         ED Course:    /84   Pulse 80   Temp 98.2 °F (36.8 °C) (Oral)   Resp 12   Ht 182.9 cm (72\")   Wt 112 kg (246 lb 11.1 oz)   SpO2 97%   BMI 33.46 kg/m²   No results found for this or any previous visit.  Medications - No data to display  No results found.    MDM:      I have seen and evaluated " this patient and agree with the nurse practitioner or physician assistant´s documentation and assessment. All charts, labs, and imaging studies were reviewed. Documentation of one or more elements of my assessment included in the medical record. I agree with findings, exam, and plan.    Disposition:   ED Disposition     None            Clincal Impression: Tendonitis    Documentation assistance provided by Dolly Rosales acting as scribe for Jordan Goss MD. Information recorded by the scribe was done at my direction and has been verified and validated by me.        Dolly Rosales  07/17/21 0923       Jordan Goss MD  07/17/21 1600

## 2021-07-17 NOTE — ED PROVIDER NOTES
Subjective     Pain  Location:  Left elbow  Severity:  Mild  Onset quality:  Gradual  Duration:  2 days  Timing:  Constant  Progression:  Unchanged  Chronicity:  Recurrent  Relieved by:  Rest  Worsened by:  Lifting  Ineffective treatments:  None  Associated symptoms: no abdominal pain, no chest pain, no congestion, no cough, no diarrhea, no ear pain, no fever, no headaches, no loss of consciousness, no myalgias, no nausea, no rhinorrhea, no shortness of breath, no sore throat and no vomiting    Elbow Pain  Associated symptoms: no fever        Review of Systems   Constitutional: Negative for chills and fever.   HENT: Negative for congestion, ear pain, rhinorrhea and sore throat.    Eyes: Negative for pain.   Respiratory: Negative for cough, chest tightness and shortness of breath.    Cardiovascular: Negative for chest pain.   Gastrointestinal: Negative for abdominal pain, diarrhea, nausea and vomiting.   Genitourinary: Negative for flank pain and hematuria.   Musculoskeletal: Negative for joint swelling and myalgias.        Left elbow pain   Skin: Negative for pallor.   Neurological: Negative for seizures, loss of consciousness and headaches.   All other systems reviewed and are negative.      Past Medical History:   Diagnosis Date   • Diabetes mellitus (CMS/HCC)    • Hyperlipidemia    • Hypertension        No Known Allergies    Past Surgical History:   Procedure Laterality Date   • VASECTOMY         History reviewed. No pertinent family history.    Social History     Socioeconomic History   • Marital status:      Spouse name: Not on file   • Number of children: Not on file   • Years of education: Not on file   • Highest education level: Not on file   Tobacco Use   • Smoking status: Never Smoker   • Smokeless tobacco: Never Used   Substance and Sexual Activity   • Alcohol use: Yes     Comment: occaisonally    • Drug use: Never   • Sexual activity: Defer           Objective   Physical Exam  Vitals and nursing  note reviewed.   Constitutional:       General: He is not in acute distress.     Appearance: Normal appearance. He is not toxic-appearing.   HENT:      Head: Normocephalic and atraumatic.      Mouth/Throat:      Mouth: Mucous membranes are moist.   Eyes:      Extraocular Movements: Extraocular movements intact.      Pupils: Pupils are equal, round, and reactive to light.   Cardiovascular:      Rate and Rhythm: Normal rate and regular rhythm.      Pulses: Normal pulses.      Heart sounds: Normal heart sounds.   Pulmonary:      Effort: Pulmonary effort is normal. No respiratory distress.      Breath sounds: Normal breath sounds.   Abdominal:      General: Abdomen is flat.      Palpations: Abdomen is soft.      Tenderness: There is no abdominal tenderness.   Musculoskeletal:         General: Tenderness (left elbow) present. Normal range of motion.      Cervical back: Normal range of motion and neck supple.   Skin:     General: Skin is warm and dry.   Neurological:      Mental Status: He is alert and oriented to person, place, and time. Mental status is at baseline.         Procedures           ED Course                                           MDM  Number of Diagnoses or Management Options  Left elbow tendonitis: minor     Amount and/or Complexity of Data Reviewed  Tests in the radiology section of CPT®: ordered and reviewed    Risk of Complications, Morbidity, and/or Mortality  Presenting problems: minimal  Diagnostic procedures: minimal  Management options: minimal        Final diagnoses:   Left elbow tendonitis       ED Disposition  ED Disposition     ED Disposition Condition Comment    Discharge Stable           Tierra Askew, APRN  [address] [address] [phone]               Medication List      New Prescriptions    ketorolac 10 MG tablet  Commonly known as: TORADOL  Take 1 tablet by mouth Every 6 (Six) Hours As Needed for Moderate Pain .           Where to Get Your Medications       These medications were sent to [facility], [place] - [address] - [phone]  - [phone] FX  [address] [place] [address]    Phone: [phone]   · ketorolac 10 MG tablet          Maxime Lindo, APRN  07/17/21 9217

## 2021-08-16 ENCOUNTER — HOSPITAL ENCOUNTER (EMERGENCY)
Facility: HOSPITAL | Age: 36
Discharge: LEFT BEFORE DISCHARGE | End: 2021-08-16

## 2021-08-16 PROCEDURE — 99211 OFF/OP EST MAY X REQ PHY/QHP: CPT

## 2021-08-26 NOTE — TELEPHONE ENCOUNTER
Caller: NITESH FERRARO    Relationship: Emergency Contact    Best call back number: [phone]     Medication needed:   Requested Prescriptions     Pending Prescriptions Disp Refills   • metFORMIN (GLUCOPHAGE) 1000 MG tablet         When do you need the refill by: 8/26/21    What additional details did the patient provide when requesting the medication: ONLY HAS 2 MORE DAYS LEFT OF MEDICATION    Does the patient have less than a 3 day supply:  [x] Yes  [] No    What is the patient's preferred pharmacy: [address] [phone] [facility] [phone]

## 2021-10-11 RX ORDER — ATORVASTATIN CALCIUM 40 MG/1
40 TABLET, FILM COATED ORAL DAILY
Qty: 90 TABLET | Refills: 0 | Status: SHIPPED | OUTPATIENT
Start: 2021-10-11 | End: 2022-01-21 | Stop reason: SDUPTHER

## 2021-10-11 RX ORDER — LISINOPRIL 20 MG/1
20 TABLET ORAL DAILY
Qty: 90 TABLET | Refills: 0 | Status: SHIPPED | OUTPATIENT
Start: 2021-10-11 | End: 2022-01-21 | Stop reason: SDUPTHER

## 2021-10-11 NOTE — TELEPHONE ENCOUNTER
Caller: NITESH FERRARO    Relationship: Emergency Contact      Medication requested (name and dosage):   atorvastatin (LIPITOR) 40 MG tablet  empagliflozin (Jardiance) 25 MG tablet tablet  lisinopril (PRINIVIL,ZESTRIL) 20 MG tablet ()    Pharmacy where request should be sent: [address] [phone] [facility] [phone] FX    Best call back number: [phone]    Does the patient have less than a 3 day supply:  [x] Yes  [] No    Rosanne Taylor Rep   10/11/21 14:18 EDT

## 2021-12-02 ENCOUNTER — TELEPHONE (OUTPATIENT)
Dept: INTERNAL MEDICINE | Facility: CLINIC | Age: 36
End: 2021-12-02

## 2021-12-02 NOTE — TELEPHONE ENCOUNTER
Caller: NITESH FERRARO    Relationship: Emergency Contact    Best call back number: [phone]    Requested Prescriptions: metFORMIN (GLUCOPHAGE) 1000 MG tablet  Requested Prescriptions      No prescriptions requested or ordered in this encounter        Pharmacy where request should be sent:     [address] [phone]  - [phone]   [phone]       Additional details provided by patient: PATIENT IS TOTALLY OUT AND HAS A FUTURE APPOINTMENT SCHEDULED    Does the patient have less than a 3 day supply:  [x] Yes  [] No    Scarlett Doe, AlexxSchkari Rep   12/02/21 12:26 EST

## 2021-12-20 ENCOUNTER — HOSPITAL ENCOUNTER (EMERGENCY)
Facility: HOSPITAL | Age: 36
Discharge: HOME | End: 2021-12-20
Attending: EMERGENCY MEDICINE | Admitting: EMERGENCY MEDICINE

## 2021-12-20 VITALS
OXYGEN SATURATION: 98 % | TEMPERATURE: 98.1 F | SYSTOLIC BLOOD PRESSURE: 149 MMHG | BODY MASS INDEX: 33.71 KG/M2 | DIASTOLIC BLOOD PRESSURE: 92 MMHG | HEART RATE: 74 BPM | WEIGHT: 248.9 LBS | HEIGHT: 72 IN | RESPIRATION RATE: 18 BRPM

## 2021-12-20 DIAGNOSIS — S46.912A MUSCLE STRAIN OF LEFT SCAPULAR REGION, INITIAL ENCOUNTER: Primary | ICD-10-CM

## 2021-12-20 DIAGNOSIS — S16.1XXA STRAIN OF NECK MUSCLE, INITIAL ENCOUNTER: ICD-10-CM

## 2021-12-20 PROCEDURE — 25010000002 KETOROLAC TROMETHAMINE PER 15 MG: Performed by: NURSE PRACTITIONER

## 2021-12-20 PROCEDURE — 96372 THER/PROPH/DIAG INJ SC/IM: CPT

## 2021-12-20 PROCEDURE — 99282 EMERGENCY DEPT VISIT SF MDM: CPT

## 2021-12-20 RX ORDER — CYCLOBENZAPRINE HCL 10 MG
10 TABLET ORAL 3 TIMES DAILY PRN
Qty: 15 TABLET | Refills: 0 | Status: SHIPPED | OUTPATIENT
Start: 2021-12-20 | End: 2022-01-21

## 2021-12-20 RX ORDER — KETOROLAC TROMETHAMINE 30 MG/ML
60 INJECTION, SOLUTION INTRAMUSCULAR; INTRAVENOUS ONCE
Status: COMPLETED | OUTPATIENT
Start: 2021-12-20 | End: 2021-12-20

## 2021-12-20 RX ADMIN — KETOROLAC TROMETHAMINE 60 MG: 60 INJECTION, SOLUTION INTRAMUSCULAR at 06:43

## 2021-12-20 NOTE — DISCHARGE INSTRUCTIONS
Rest.  Moist heat.    Take medications as prescribed.    Follow-up with your PCP if no better    Return for new or worsening symptoms

## 2021-12-20 NOTE — ED NOTES
Pt discharged in stable condition. Pt given discharge instructions and verbalized understanding. Provider deemed pt stable for DC. Rx x2 given to pt, pt verbalized understanding on prescriptions. NAD noted. PORFIRIO.        Valencia Hester, RN  12/20/21 0666

## 2021-12-20 NOTE — ED PROVIDER NOTES
Time: 06:02 EST  Arrived by: Private vehicle  Chief Complaint: Left shoulder pain and neck  History provided by: Patient  History is limited by: N/A    History of Present Illness:  Patient is a 36 y.o. year old male that presents to the emergency department with worsening left shoulder pain and neck      Neck Pain  Pain location:  L side  Quality:  Aching and burning  Pain radiates to:  L shoulder  Pain severity:  Moderate  Pain is:  Same all the time  Onset quality:  Gradual  Duration:  2 days  Timing:  Constant  Progression:  Waxing and waning  Chronicity:  New  Context comment:  No known injury but patient does a lot of repetitive work and has to lift and move things  Relieved by: Resting in certain positions help but it still hurts.  Worsened by:  Position  Ineffective treatments: Tylenol.  Associated symptoms: no chest pain, no fever, no numbness, no paresis, no tingling and no weakness    Back Pain  Location:  Thoracic spine (left shoulder blade)  Quality:  Aching and burning  Radiates to: into left neck.  Pain severity:  Severe  Pain is:  Unable to specify  Onset quality:  Gradual  Duration:  2 days  Timing:  Constant  Progression:  Waxing and waning  Chronicity:  New  Context comment:  No known injury but does over a lot of repetitive lifting and movement at work  Relieved by:  Being still  Worsened by:  Palpation, touching and movement  Ineffective treatments:  OTC medications  Associated symptoms: no chest pain, no fever, no numbness, no tingling and no weakness            Similar Symptoms Previously: No  Recently seen: No      Patient Care Team  Primary Care Provider: None    Past Medical History:     No Known Allergies  Past Medical History:   Diagnosis Date   • Diabetes mellitus (HCC)    • Hyperlipidemia    • Hypertension      Past Surgical History:   Procedure Laterality Date   • VASECTOMY       History reviewed. No pertinent family history.    Home Medications:  Prior to Admission medications   "  Medication Sig Start Date End Date Taking? Authorizing Provider   atorvastatin (LIPITOR) 40 MG tablet Take 1 tablet by mouth Daily. 10/11/21   Tierra Askew APRN   empagliflozin (Jardiance) 25 MG tablet tablet Take 1 tablet by mouth Daily. 10/11/21   Tierra Askew APRN   ketorolac (TORADOL) 10 MG tablet Take 1 tablet by mouth Every 6 (Six) Hours As Needed for Moderate Pain . 7/17/21   Maixme Lindo APRN   lisinopril (PRINIVIL,ZESTRIL) 20 MG tablet Take 1 tablet by mouth Daily for 90 days. 10/11/21 1/9/22  Tierra Askew APRN   metFORMIN (GLUCOPHAGE) 1000 MG tablet Take 1 tablet by mouth 2 (Two) Times a Day With Meals. Needs follow up appointment 12/3/21   Tierra Askew APRN        Social History:   PT  reports that he has never smoked. He has never used smokeless tobacco. He reports current alcohol use. He reports that he does not use drugs.    Record Review:  I have reviewed the patient's records in [facility].     Review of Systems  Review of Systems   Constitutional: Negative for chills and fever.   Cardiovascular: Negative for chest pain.   Genitourinary: Negative for flank pain.   Musculoskeletal: Positive for back pain and neck pain.   Skin: Negative.    Neurological: Negative for tingling, weakness and numbness.   Hematological: Negative.    Psychiatric/Behavioral: Negative.         Physical Exam  /92 (BP Location: Left arm, Patient Position: Sitting)   Pulse 74   Temp 98.1 °F (36.7 °C) (Oral)   Resp 18   Ht 182.9 cm (72\")   Wt 113 kg (248 lb 14.4 oz)   SpO2 98%   BMI 33.76 kg/m²     Physical Exam  Vitals and nursing note reviewed.   Constitutional:       General: He is not in acute distress.     Appearance: Normal appearance. He is not toxic-appearing.   HENT:      Head: Atraumatic.   Eyes:      General: No scleral icterus.  Neck:      Comments: Full range of motion but complains of worsening pain with head movement  Cardiovascular:      Rate and Rhythm: Normal rate and regular rhythm.      " "Pulses: Normal pulses.      Heart sounds: Normal heart sounds.   Pulmonary:      Effort: Pulmonary effort is normal. No respiratory distress.      Breath sounds: Normal breath sounds.   Abdominal:      Tenderness: There is no abdominal tenderness.   Musculoskeletal:         General: Tenderness present. Normal range of motion.      Cervical back: Normal range of motion. Tenderness ( left lower soft tissues down onto shoulder and shoulder blade) present. No rigidity.   Skin:     General: Skin is warm and dry.      Capillary Refill: Capillary refill takes less than 2 seconds.   Neurological:      Mental Status: He is alert and oriented to person, place, and time.      Sensory: No sensory deficit.      Motor: No weakness.   Psychiatric:         Mood and Affect: Mood normal.         Behavior: Behavior normal.              ED Course  /92 (BP Location: Left arm, Patient Position: Sitting)   Pulse 74   Temp 98.1 °F (36.7 °C) (Oral)   Resp 18   Ht 182.9 cm (72\")   Wt 113 kg (248 lb 14.4 oz)   SpO2 98%   BMI 33.76 kg/m²   No results found for this or any previous visit.  Medications   ketorolac (TORADOL) injection 60 mg (has no administration in time range)     No results found.    Medical Decision Making:                     MDM  Number of Diagnoses or Management Options  Muscle strain of left scapular region, initial encounter  Strain of neck muscle, initial encounter  Diagnosis management comments: I have spoken with the patient. I have explained the patient´s condition, diagnoses and treatment plan based on the information available to me at this time. I have answered the patient's questions and addressed any concerns. The patient has a good  understanding of the patient´s diagnosis, condition, and treatment plan as can be expected at this point. The vital signs have been stable. The patient´s condition is stable and appropriate for discharge from the emergency department.      The patient will pursue further " outpatient evaluation with the primary care physician or other designated or consulting physician as outlined in the discharge instructions. They are agreeable to this plan of care and follow-up instructions have been explained in detail. The patient has received these instructions in written format and have expressed an understanding of the discharge instructions. The patient is aware that any significant change in condition or worsening of symptoms should prompt an immediate return to this or the closest emergency department or call to 911.         Amount and/or Complexity of Data Reviewed  Tests in the medicine section of CPT®: ordered and reviewed    Risk of Complications, Morbidity, and/or Mortality  Presenting problems: minimal  Management options: minimal    Patient Progress  Patient progress: stable       Final diagnoses:   Muscle strain of left scapular region, initial encounter   Strain of neck muscle, initial encounter        Disposition:  ED Disposition     ED Disposition Condition Comment    Discharge Stable            Kaylah Morales, APRN  12/20/21 0602

## 2022-01-07 ENCOUNTER — HOSPITAL ENCOUNTER (EMERGENCY)
Facility: HOSPITAL | Age: 37
Discharge: HOME | End: 2022-01-07
Attending: EMERGENCY MEDICINE | Admitting: EMERGENCY MEDICINE

## 2022-01-07 VITALS
RESPIRATION RATE: 18 BRPM | HEIGHT: 72 IN | HEART RATE: 106 BPM | OXYGEN SATURATION: 97 % | BODY MASS INDEX: 32.73 KG/M2 | WEIGHT: 241.62 LBS | TEMPERATURE: 97.7 F | SYSTOLIC BLOOD PRESSURE: 101 MMHG | DIASTOLIC BLOOD PRESSURE: 60 MMHG

## 2022-01-07 DIAGNOSIS — B34.9 VIRAL SYNDROME: Primary | ICD-10-CM

## 2022-01-07 DIAGNOSIS — Z20.822 SUSPECTED COVID-19 VIRUS INFECTION: ICD-10-CM

## 2022-01-07 LAB
FLUAV AG NPH QL: NEGATIVE
FLUBV AG NPH QL IA: NEGATIVE

## 2022-01-07 PROCEDURE — 87804 INFLUENZA ASSAY W/OPTIC: CPT | Performed by: EMERGENCY MEDICINE

## 2022-01-07 PROCEDURE — U0004 COV-19 TEST NON-CDC HGH THRU: HCPCS | Performed by: EMERGENCY MEDICINE

## 2022-01-07 PROCEDURE — 99283 EMERGENCY DEPT VISIT LOW MDM: CPT

## 2022-01-07 PROCEDURE — C9803 HOPD COVID-19 SPEC COLLECT: HCPCS

## 2022-01-07 NOTE — ED PROVIDER NOTES
Subjective     History provided by:  Patient  Illness  Location:  Generalized  Quality:  Aches  Severity:  Moderate  Onset quality:  Sudden  Duration:  12 hours  Timing:  Constant  Progression:  Unchanged  Chronicity:  New  Context:  Pt reports he woke with fever of 100, body aches, headaches and diarrhea. This afternoon he took his medicine and vomited once. Denies sick contacts but would like Covid testing  Relieved by:  Nothing  Worsened by:  Nothing  Ineffective treatments:  None tried  Associated symptoms: diarrhea, fatigue, fever, headaches, myalgias, nausea and vomiting    Associated symptoms: no abdominal pain, no chest pain, no congestion, no cough, no ear pain, no loss of consciousness, no rash, no rhinorrhea, no shortness of breath, no sore throat and no wheezing        Review of Systems   Constitutional: Positive for fatigue and fever. Negative for chills.   HENT: Negative for congestion, ear pain, rhinorrhea and sore throat.    Eyes: Negative for pain.   Respiratory: Negative for cough, chest tightness, shortness of breath and wheezing.    Cardiovascular: Negative for chest pain.   Gastrointestinal: Positive for diarrhea, nausea and vomiting. Negative for abdominal pain.   Genitourinary: Negative for flank pain and hematuria.   Musculoskeletal: Positive for myalgias. Negative for joint swelling.   Skin: Negative for pallor and rash.   Neurological: Positive for headaches. Negative for seizures and loss of consciousness.   All other systems reviewed and are negative.      Past Medical History:   Diagnosis Date   • Diabetes mellitus (HCC)    • Hyperlipidemia    • Hypertension        No Known Allergies    Past Surgical History:   Procedure Laterality Date   • VASECTOMY         History reviewed. No pertinent family history.    Social History     Socioeconomic History   • Marital status:    Tobacco Use   • Smoking status: Never Smoker   • Smokeless tobacco: Never Used   Substance and Sexual Activity    • Alcohol use: Yes     Comment: occaisonally    • Drug use: Never   • Sexual activity: Defer           Objective   Physical Exam  Vitals and nursing note reviewed.   Constitutional:       General: He is not in acute distress.     Appearance: Normal appearance. He is ill-appearing. He is not toxic-appearing.   HENT:      Head: Normocephalic and atraumatic.      Mouth/Throat:      Mouth: Mucous membranes are moist.   Eyes:      General: No scleral icterus.  Cardiovascular:      Rate and Rhythm: Normal rate and regular rhythm.      Pulses: Normal pulses.      Heart sounds: Normal heart sounds.   Pulmonary:      Effort: Pulmonary effort is normal. No respiratory distress.      Breath sounds: Normal breath sounds.   Abdominal:      General: Abdomen is flat.      Palpations: Abdomen is soft.      Tenderness: There is no abdominal tenderness.   Musculoskeletal:         General: Normal range of motion.      Cervical back: Normal range of motion and neck supple.   Skin:     General: Skin is warm and dry.   Neurological:      Mental Status: He is alert and oriented to person, place, and time. Mental status is at baseline.         Procedures           ED Course                                                 MDM  Number of Diagnoses or Management Options  Suspected COVID-19 virus infection: new and requires workup  Viral syndrome: new and requires workup  Diagnosis management comments: The patient is resting comfortably and feels better, is alert and in no distress. Influenza swab is negative.  On re-examination the patient does not appear toxic and has no meningeal signs (including a negative Kernig and Brudzinski sign), and there's no intractable vomiting, respiratory distress and no apparent pain. Based on the history, exam, diagnostic testing and reassessment, the patient has no signs of meningitis, significant pneumonia, pyelonephritis, sepsis or other acute serious bacterial infections, or other significant pathology  to warrant further testing, continued ED treatment, admission or specialist evaluation. The patient's vital signs have been stable. The patient's condition is stable and is appropriate for discharge.  The patient´s symptoms are consistent with a viral syndrome. The patient was counseled to return to the ED for re-evaluation for worsening cough, shortness of breath, uncontrollable headache, uncontrollable fever, altered mental status, or any symptoms which cause them concern. The patient will pursue further outpatient evaluation with the primary care physician or other designated or consultant physician as indicated in the discharge instructions.       Amount and/or Complexity of Data Reviewed  Clinical lab tests: ordered and reviewed    Risk of Complications, Morbidity, and/or Mortality  Presenting problems: low  Diagnostic procedures: minimal  Management options: low    Patient Progress  Patient progress: stable      Final diagnoses:   Viral syndrome   Suspected COVID-19 virus infection       ED Disposition  ED Disposition     ED Disposition Condition Comment    Discharge Stable           Tierra Askew, APRN  [address] [id number] [phone]      As needed         Medication List      No changes were made to your prescriptions during this visit.          Willem Rashid, ELIO  01/07/22 1931

## 2022-01-08 LAB — SARS-COV-2 RNA PNL SPEC NAA+PROBE: DETECTED

## 2022-01-11 ENCOUNTER — TELEPHONE (OUTPATIENT)
Dept: INTERNAL MEDICINE | Facility: CLINIC | Age: 37
End: 2022-01-11

## 2022-01-11 NOTE — TELEPHONE ENCOUNTER
Caller: Matt Albarran    Relationship: Self    Best call back number: [phone]    What medication are you requesting: EYE MEDICATIONS    What are your current symptoms: EYE PAIN, ANY MOVEMENT, NO REDNESS, BOTH EYES    How long have you been experiencing symptoms: 4 DAYS    Have you had these symptoms before:    [] Yes  [x] No    Have you been treated for these symptoms before:   [] Yes  [x] No    If a prescription is needed, what is your preferred pharmacy and phone number:  [address] [phone] [facility] [phone]   [phone]    Additional notes: PATIENT STATES HE IS EXPERIENCING EXTREME EYE PAIN SINCE HIS COVID DIAGNOSIS. ALMOST FEELS LIKE PINK EYE BUT WITHOUT REDNESS. HE IS REQUESTING MEDICATION FOR IT.

## 2022-01-18 NOTE — TELEPHONE ENCOUNTER
Spoke with patient he stated that he had COVID and he was having headaches and eye pain. He is not having it now. He stated that the eye pain was from the headaches.

## 2022-01-21 ENCOUNTER — OFFICE VISIT (OUTPATIENT)
Dept: INTERNAL MEDICINE | Facility: CLINIC | Age: 37
End: 2022-01-21

## 2022-01-21 ENCOUNTER — TELEPHONE (OUTPATIENT)
Dept: INTERNAL MEDICINE | Facility: CLINIC | Age: 37
End: 2022-01-21

## 2022-01-21 VITALS
BODY MASS INDEX: 32.34 KG/M2 | TEMPERATURE: 96.3 F | HEIGHT: 72 IN | OXYGEN SATURATION: 98 % | SYSTOLIC BLOOD PRESSURE: 122 MMHG | HEART RATE: 59 BPM | WEIGHT: 238.8 LBS | DIASTOLIC BLOOD PRESSURE: 82 MMHG

## 2022-01-21 DIAGNOSIS — Z23 NEED FOR VACCINATION: Primary | ICD-10-CM

## 2022-01-21 DIAGNOSIS — I10 ESSENTIAL HYPERTENSION: ICD-10-CM

## 2022-01-21 DIAGNOSIS — E78.5 HYPERLIPIDEMIA, UNSPECIFIED HYPERLIPIDEMIA TYPE: ICD-10-CM

## 2022-01-21 DIAGNOSIS — E11.65 TYPE 2 DIABETES MELLITUS WITH HYPERGLYCEMIA, WITHOUT LONG-TERM CURRENT USE OF INSULIN: ICD-10-CM

## 2022-01-21 DIAGNOSIS — F41.9 ANXIETY: ICD-10-CM

## 2022-01-21 LAB
ALBUMIN SERPL-MCNC: 4.8 G/DL (ref 3.5–5.2)
ALBUMIN UR-MCNC: 1.9 MG/DL
ALBUMIN/GLOB SERPL: 1.8 G/DL
ALP SERPL-CCNC: 64 U/L (ref 39–117)
ALT SERPL W P-5'-P-CCNC: 20 U/L (ref 1–41)
ANION GAP SERPL CALCULATED.3IONS-SCNC: 9.7 MMOL/L (ref 5–15)
AST SERPL-CCNC: 19 U/L (ref 1–40)
BASOPHILS # BLD AUTO: 0.03 10*3/MM3 (ref 0–0.2)
BASOPHILS NFR BLD AUTO: 0.5 % (ref 0–1.5)
BILIRUB SERPL-MCNC: 0.9 MG/DL (ref 0–1.2)
BUN SERPL-MCNC: 13 MG/DL (ref 6–20)
BUN/CREAT SERPL: 15.7 (ref 7–25)
CALCIUM SPEC-SCNC: 9.8 MG/DL (ref 8.6–10.5)
CHLORIDE SERPL-SCNC: 99 MMOL/L (ref 98–107)
CHOLEST SERPL-MCNC: 112 MG/DL (ref 0–200)
CO2 SERPL-SCNC: 28.3 MMOL/L (ref 22–29)
CREAT SERPL-MCNC: 0.83 MG/DL (ref 0.76–1.27)
DEPRECATED RDW RBC AUTO: 39.6 FL (ref 37–54)
EOSINOPHIL # BLD AUTO: 0.03 10*3/MM3 (ref 0–0.4)
EOSINOPHIL NFR BLD AUTO: 0.5 % (ref 0.3–6.2)
ERYTHROCYTE [DISTWIDTH] IN BLOOD BY AUTOMATED COUNT: 13 % (ref 12.3–15.4)
GFR SERPL CREATININE-BSD FRML MDRD: 106 ML/MIN/1.73
GLOBULIN UR ELPH-MCNC: 2.6 GM/DL
GLUCOSE SERPL-MCNC: 97 MG/DL (ref 65–99)
HBA1C MFR BLD: 8.24 % (ref 4.8–5.6)
HCT VFR BLD AUTO: 49.5 % (ref 37.5–51)
HDLC SERPL-MCNC: 23 MG/DL (ref 40–60)
HGB BLD-MCNC: 16.4 G/DL (ref 13–17.7)
IMM GRANULOCYTES # BLD AUTO: 0.03 10*3/MM3 (ref 0–0.05)
IMM GRANULOCYTES NFR BLD AUTO: 0.5 % (ref 0–0.5)
LDLC SERPL CALC-MCNC: 64 MG/DL (ref 0–100)
LDLC/HDLC SERPL: 2.63 {RATIO}
LYMPHOCYTES # BLD AUTO: 1.45 10*3/MM3 (ref 0.7–3.1)
LYMPHOCYTES NFR BLD AUTO: 24.4 % (ref 19.6–45.3)
MCH RBC QN AUTO: 28.5 PG (ref 26.6–33)
MCHC RBC AUTO-ENTMCNC: 33.1 G/DL (ref 31.5–35.7)
MCV RBC AUTO: 86.1 FL (ref 79–97)
MONOCYTES # BLD AUTO: 0.4 10*3/MM3 (ref 0.1–0.9)
MONOCYTES NFR BLD AUTO: 6.7 % (ref 5–12)
NEUTROPHILS NFR BLD AUTO: 4 10*3/MM3 (ref 1.7–7)
NEUTROPHILS NFR BLD AUTO: 67.4 % (ref 42.7–76)
NRBC BLD AUTO-RTO: 0 /100 WBC (ref 0–0.2)
PLATELET # BLD AUTO: 207 10*3/MM3 (ref 140–450)
PMV BLD AUTO: 11.6 FL (ref 6–12)
POTASSIUM SERPL-SCNC: 4.5 MMOL/L (ref 3.5–5.2)
PROT SERPL-MCNC: 7.4 G/DL (ref 6–8.5)
RBC # BLD AUTO: 5.75 10*6/MM3 (ref 4.14–5.8)
SODIUM SERPL-SCNC: 137 MMOL/L (ref 136–145)
TRIGL SERPL-MCNC: 143 MG/DL (ref 0–150)
TSH SERPL DL<=0.05 MIU/L-ACNC: 1.51 UIU/ML (ref 0.27–4.2)
VLDLC SERPL-MCNC: 25 MG/DL (ref 5–40)
WBC NRBC COR # BLD: 5.94 10*3/MM3 (ref 3.4–10.8)

## 2022-01-21 PROCEDURE — 90732 PPSV23 VACC 2 YRS+ SUBQ/IM: CPT | Performed by: NURSE PRACTITIONER

## 2022-01-21 PROCEDURE — 80050 GENERAL HEALTH PANEL: CPT | Performed by: NURSE PRACTITIONER

## 2022-01-21 PROCEDURE — 82043 UR ALBUMIN QUANTITATIVE: CPT | Performed by: NURSE PRACTITIONER

## 2022-01-21 PROCEDURE — 83036 HEMOGLOBIN GLYCOSYLATED A1C: CPT | Performed by: NURSE PRACTITIONER

## 2022-01-21 PROCEDURE — 90471 IMMUNIZATION ADMIN: CPT | Performed by: NURSE PRACTITIONER

## 2022-01-21 PROCEDURE — 80061 LIPID PANEL: CPT | Performed by: NURSE PRACTITIONER

## 2022-01-21 PROCEDURE — 99214 OFFICE O/P EST MOD 30 MIN: CPT | Performed by: NURSE PRACTITIONER

## 2022-01-21 RX ORDER — BUSPIRONE HYDROCHLORIDE 5 MG/1
5 TABLET ORAL 3 TIMES DAILY PRN
Qty: 90 TABLET | Refills: 1 | Status: SHIPPED | OUTPATIENT
Start: 2022-01-21 | End: 2022-12-08

## 2022-01-21 RX ORDER — LISINOPRIL 20 MG/1
20 TABLET ORAL DAILY
Qty: 90 TABLET | Refills: 1 | Status: SHIPPED | OUTPATIENT
Start: 2022-01-21 | End: 2022-09-08 | Stop reason: SDUPTHER

## 2022-01-21 RX ORDER — ATORVASTATIN CALCIUM 40 MG/1
40 TABLET, FILM COATED ORAL DAILY
Qty: 90 TABLET | Refills: 1 | Status: SHIPPED | OUTPATIENT
Start: 2022-01-21 | End: 2022-09-08 | Stop reason: SDUPTHER

## 2022-01-21 RX ORDER — BLOOD SUGAR DIAGNOSTIC
STRIP MISCELLANEOUS
Qty: 100 EACH | Refills: 1 | Status: SHIPPED | OUTPATIENT
Start: 2022-01-21 | End: 2023-01-10 | Stop reason: SDUPTHER

## 2022-01-21 NOTE — TELEPHONE ENCOUNTER
[facility] WAS UNABLE TO WARM TRANSFER     Caller: [facility] [address] [facility] [place] [facility] [address] [facility] [phone] [facility] [phone] [facility]    Relationship: Pharmacy    Best call back number: [phone]    What is the best time to reach you: ANYTIME     Who are you requesting to speak with (clinical staff, provider,  specific staff member): CLINICAL         What was the call regarding: DIRECTION, INSTRUCTIONS FOR TEST STRIPS     Do you require a callback: YES

## 2022-01-21 NOTE — PROGRESS NOTES
"Chief Complaint  Follow-up (on medication), Labs Only (pt states he needs labs for medication), and Anxiety (pt wanting to see about medication for anxiety)    Subjective          Matt Albarran presents to [facility]  DM2-  Managed with Jardiance and metformin. Most recent A1c 9.7, this was approximately 10 months ago. He does not check his blood glucose levels at home, had an issue getting strip refills. Urine microalbumin: 3/2021. Diabetic foot exam: Due. Diabetic eye exam: 2021. Pneumonia vaccination: Would like. Renal protection: ACE.    HTN-  Managed with Lisinopril. He does not check blood pressure at home. Denies chest pain, blurry vision, headache, leg swelling.    HLD-  Managed with statin. Most recent LDL 82, due for repeat labs.     Anxiety-  Patient states he finds himself getting stressed out very quickly, states his brother is in therapy and told him he needs to consider medication or therapy.  Patient reports he has zero patience and will find himself getting upset with himself for getting impatient so quickly.  He does not feel that there is a depression component.  Patient states he struggles with this mostly at home, but also has concerns at work.  Patient has 4 kids ages 10, 9, 6 and 3.  Patient states that 2 of his children have ADHD and he feels that 1 may have autism.  This is overwhelming for him at times.  He is interested in speaking with a therapist.  Denies SI/HI.     Influenza vaccination: Declines  COVID19 vaccination: Hesitant but considering   PSA: Denies family history of prostate cancer  Colonoscopy: Denies family history of colon cancer      Objective   Vital Signs:   /82   Pulse 59   Temp 96.3 °F (35.7 °C)   Ht 182.9 cm (72\")   Wt 108 kg (238 lb 12.8 oz)   SpO2 98%   BMI 32.39 kg/m²     Physical Exam  Constitutional:       Appearance: Normal appearance. He is normal weight.   HENT:      Head: Normocephalic and " atraumatic.      Nose: Nose normal.      Mouth/Throat:      Mouth: Mucous membranes are moist.      Pharynx: Oropharynx is clear.   Eyes:      Extraocular Movements: Extraocular movements intact.      Conjunctiva/sclera: Conjunctivae normal.      Pupils: Pupils are equal, round, and reactive to light.   Neck:      Thyroid: No thyroid mass, thyromegaly or thyroid tenderness.   Cardiovascular:      Rate and Rhythm: Normal rate and regular rhythm.      Heart sounds: Normal heart sounds.   Pulmonary:      Effort: Pulmonary effort is normal.      Breath sounds: Normal breath sounds.   Skin:     General: Skin is warm and dry.   Neurological:      General: No focal deficit present.      Mental Status: He is alert and oriented to person, place, and time.   Psychiatric:         Mood and Affect: Mood normal.         Behavior: Behavior normal.         Thought Content: Thought content normal.        Result Review :                 Assessment and Plan    Diagnoses and all orders for this visit:    1. Need for vaccination (Primary)  Comments:  Pneumonia vaccination in clinic today.  Discussed recommendation for COVID-19 vaccination, patient to consider.  Orders:  -     pneumococcal polysaccharide 23-valent (PNEUMOVAX-23) vaccine 0.5 mL    2. Essential hypertension  Assessment & Plan:  Well controlled, continue lisinopril. Encouraged patient to continue to monitor blood pressure at home and to call or return to clinic with consistent elevations greater than 130/80. Labs in clinic today to include CBC, CMP.      Orders:  -     CBC & Differential  -     Comprehensive Metabolic Panel    3. Type 2 diabetes mellitus with hyperglycemia, without long-term current use of insulin (HCC)  Assessment & Plan:  Poorly controlled on previous labs, most recent HgbA1c 9.7.  Patient has consistently been taking metformin and Jardiance since that time.  Encouraged patient to continue to monitor blood glucose levels and to call with consistent  elevations, glucometer strips refilled. Repeat labs in clinic today, will determine if medication adjustments are warranted based on results. Diabetic eye exam: 2021. Diabetic foot exam: Today.  Urine microalbumin: Today. Pneumonia vaccination: Today. Renal protection: ACE.      Orders:  -     CBC & Differential  -     Comprehensive Metabolic Panel  -     Lipid Panel  -     TSH  -     MicroAlbumin, Urine, Random - Urine, Clean Catch  -     Hemoglobin A1c    4. Anxiety  Assessment & Plan:  Discussed options for management, including SSRI/SNRI and/or BuSpar.  Patient would like to proceed with BuSpar at this time, prescription to pharmacy.  Provided patient with local therapy resource list.  He will continue to monitor and seek medical attention immediately if he feels that his mental health is deteriorating.  Denies SI/HI.  Will follow-up in 1 month to assess medication effectiveness, sooner if concerns arise.      5. Hyperlipidemia, unspecified hyperlipidemia type  Assessment & Plan:  Continue statin. Most recent LDL 82.  Goal less than 70 with DM2. Lipid panel with labs, will determine medication adjustments are warranted based on results.      Orders:  -     Comprehensive Metabolic Panel    Other orders  -     busPIRone (BUSPAR) 5 MG tablet; Take 1 tablet by mouth 3 (Three) Times a Day As Needed (anxiety).  Dispense: 90 tablet; Refill: 1  -     atorvastatin (LIPITOR) 40 MG tablet; Take 1 tablet by mouth Daily.  Dispense: 90 tablet; Refill: 1  -     empagliflozin (Jardiance) 25 MG tablet tablet; Take 1 tablet by mouth Daily.  Dispense: 90 tablet; Refill: 1  -     lisinopril (PRINIVIL,ZESTRIL) 20 MG tablet; Take 1 tablet by mouth Daily for 90 days.  Dispense: 90 tablet; Refill: 1  -     metFORMIN (GLUCOPHAGE) 1000 MG tablet; Take 1 tablet by mouth 2 (Two) Times a Day With Meals for 90 days.  Dispense: 180 tablet; Refill: 1  -     glucose blood (ReliOn True Metrix Test Strips) test strip; Use as instructed  Dispense:  100 each; Refill: 1      Follow Up   Return in about 1 month (around 2/21/2022).  Patient was given instructions and counseling regarding his condition or for health maintenance advice. Please see specific information pulled into the AVS if appropriate.

## 2022-01-21 NOTE — ASSESSMENT & PLAN NOTE
Continue statin. Most recent LDL 82.  Goal less than 70 with DM2. Lipid panel with labs, will determine medication adjustments are warranted based on results.

## 2022-01-21 NOTE — ASSESSMENT & PLAN NOTE
Poorly controlled on previous labs, most recent HgbA1c 9.7.  Patient has consistently been taking metformin and Jardiance since that time.  Encouraged patient to continue to monitor blood glucose levels and to call with consistent elevations, glucometer strips refilled. Repeat labs in clinic today, will determine if medication adjustments are warranted based on results. Diabetic eye exam: 2021. Diabetic foot exam: Today.  Urine microalbumin: Today. Pneumonia vaccination: Today. Renal protection: ACE.

## 2022-01-21 NOTE — ASSESSMENT & PLAN NOTE
Well controlled, continue lisinopril. Encouraged patient to continue to monitor blood pressure at home and to call or return to clinic with consistent elevations greater than 130/80. Labs in clinic today to include CBC, CMP.

## 2022-01-21 NOTE — ASSESSMENT & PLAN NOTE
Discussed options for management, including SSRI/SNRI and/or BuSpar.  Patient would like to proceed with BuSpar at this time, prescription to pharmacy.  Provided patient with local therapy resource list.  He will continue to monitor and seek medical attention immediately if he feels that his mental health is deteriorating.  Denies SI/HI.  Will follow-up in 1 month to assess medication effectiveness, sooner if concerns arise.

## 2022-01-25 NOTE — TELEPHONE ENCOUNTER
Spoke with Ishan he said he needed the frequency for testing usually if not on insulin its once a day. So I told him that was fine

## 2022-01-26 ENCOUNTER — TELEPHONE (OUTPATIENT)
Dept: INTERNAL MEDICINE | Facility: CLINIC | Age: 37
End: 2022-01-26

## 2022-01-26 NOTE — TELEPHONE ENCOUNTER
[employer] message sent    ----- Message from ELIO Berger sent at 1/24/2022  9:35 AM EST -----  Attempted to call patient to discuss results, mailbox not set up.  LDL, bad cholesterol, well controlled.  HDL, good cholesterol, low.  Continue to work on increasing physical activity and healthy food intake to improve naturally.  Continue statin.  A1c has improved from 9.7 to 8.24, goal is less than 7.  Continue current medications.  A medication needs to be added, options include another pill called Januvia or a once weekly injectable medication.  Please asked patient which direction he would like to proceed.

## 2022-05-17 ENCOUNTER — PRIOR AUTHORIZATION (OUTPATIENT)
Dept: INTERNAL MEDICINE | Facility: CLINIC | Age: 37
End: 2022-05-17

## 2022-05-17 NOTE — TELEPHONE ENCOUNTER
Pa started for jardiance via cover my meds    Options w/o a pa    Glimepiride Not Required  GlipiZIDE Not Required  GlipiZIDE ER Not Required  MetFORMIN HCl Not Required  MetFORMIN HCl ER Not Required  Pioglitazone HCl Not Required

## 2022-09-09 RX ORDER — ATORVASTATIN CALCIUM 40 MG/1
40 TABLET, FILM COATED ORAL DAILY
Qty: 90 TABLET | Refills: 1 | Status: SHIPPED | OUTPATIENT
Start: 2022-09-09

## 2022-09-09 RX ORDER — LISINOPRIL 20 MG/1
20 TABLET ORAL DAILY
Qty: 90 TABLET | Refills: 1 | Status: SHIPPED | OUTPATIENT
Start: 2022-09-09 | End: 2023-03-27

## 2022-12-08 ENCOUNTER — OFFICE VISIT (OUTPATIENT)
Dept: INTERNAL MEDICINE | Facility: CLINIC | Age: 37
End: 2022-12-08

## 2022-12-08 VITALS
DIASTOLIC BLOOD PRESSURE: 72 MMHG | WEIGHT: 236 LBS | HEART RATE: 76 BPM | HEIGHT: 72 IN | BODY MASS INDEX: 31.97 KG/M2 | SYSTOLIC BLOOD PRESSURE: 122 MMHG | OXYGEN SATURATION: 98 %

## 2022-12-08 DIAGNOSIS — M25.511 ACUTE PAIN OF RIGHT SHOULDER: Primary | ICD-10-CM

## 2022-12-08 PROCEDURE — 96372 THER/PROPH/DIAG INJ SC/IM: CPT

## 2022-12-08 PROCEDURE — 99213 OFFICE O/P EST LOW 20 MIN: CPT

## 2022-12-08 RX ORDER — CYCLOBENZAPRINE HCL 5 MG
5 TABLET ORAL 3 TIMES DAILY PRN
Qty: 30 TABLET | Refills: 0 | Status: SHIPPED | OUTPATIENT
Start: 2022-12-08 | End: 2023-03-27

## 2022-12-08 RX ORDER — KETOROLAC TROMETHAMINE 30 MG/ML
30 INJECTION, SOLUTION INTRAMUSCULAR; INTRAVENOUS ONCE
Status: COMPLETED | OUTPATIENT
Start: 2022-12-08 | End: 2022-12-08

## 2022-12-08 RX ADMIN — KETOROLAC TROMETHAMINE 30 MG: 30 INJECTION, SOLUTION INTRAMUSCULAR; INTRAVENOUS at 09:02

## 2022-12-08 NOTE — ASSESSMENT & PLAN NOTE
- Discussed possible etiologies, right rotator cuff tendinitis versus musculoskeletal.  Advised patient to limit right shoulder strain at work if possible.  Demonstrated different shoulder stretches he could do at home daily.  Informed patient he could try lidocaine patches and/or Voltaren gel.  Ibuprofen as needed.  Cooing/Heating.  -Ketorolac shot in office.   -Flexeril to use as needed.  Informed patient medication may cause drowsiness.  -We will get x-ray of right shoulder to rule out any minor fractures or dislocations.  -Informed patient to monitor symptoms, should improve within the next 4 to 6 weeks.  If symptoms do not resolve or drastically progressed please return to clinic, will consider referral to physical therapy or possibly MRI.

## 2022-12-08 NOTE — PROGRESS NOTES
"Chief Complaint  Shoulder Pain (Pt coming in wit shoulder pain, been going on for 3-6 months. )    Subjective       Matt Albarran presents to [facility]    [facility] shoulder pain- pain starts on deltoid and right sided upper back. Pain is constant, severity intermittent. Patient is rubbing biofreeze and using tylenol. Originally pain started after Tdap shot on 09/22. Pain right now is a 1/10. Pain notes is a sharp pain. Patient notes it feels like being punch in arm.  Patient job does require constant moving and lifting boxes less than 10-15 pounds, patient is trying to avoid strain on right shoulder at work. Patient notes upper right shoulder feels tense.   Objective     Vitals:    12/08/22 0814   BP: 122/72   Pulse: 76   SpO2: 98%   Weight: 107 kg (236 lb)   Height: 182.9 cm (72\")      Wt Readings from Last 3 Encounters:   12/08/22 107 kg (236 lb)   09/16/22 107 kg (236 lb 9.6 oz)   01/21/22 108 kg (238 lb 12.8 oz)      BP Readings from Last 3 Encounters:   12/08/22 122/72   09/16/22 133/73   01/21/22 122/82        Body mass index is 32.01 kg/m².           Physical Exam  Constitutional:       Appearance: Normal appearance.   HENT:      Head: Normocephalic and atraumatic.      Right Ear: Tympanic membrane, ear canal and external ear normal.      Left Ear: Tympanic membrane, ear canal and external ear normal.      Nose: Nose normal.      Mouth/Throat:      Mouth: Mucous membranes are moist.      Pharynx: Oropharynx is clear. Posterior oropharyngeal erythema present.   Eyes:      Conjunctiva/sclera: Conjunctivae normal.   Cardiovascular:      Rate and Rhythm: Normal rate and regular rhythm.      Pulses: Normal pulses.      Heart sounds: Normal heart sounds.   Pulmonary:      Breath sounds: Normal breath sounds.   Musculoskeletal:      Comments: Patient has full range of motion of right and left shoulder.  No tenderness to palpation to right shoulder, deltoid or " right trapezius.  Reports tenderness of right shoulder with AB duction of right arm and when reaching overhead with the right arm.  Sensation and strength of shoulder, tricep and biceps intact.  Tightness of right trapezius upon palpation.   Skin:     General: Skin is warm and dry.   Neurological:      Mental Status: He is alert and oriented to person, place, and time.   Psychiatric:         Mood and Affect: Mood normal.         Thought Content: Thought content normal.         Judgment: Judgment normal.          Result Review :   The following data was reviewed by: Caty Chauhan PA-C on 12/08/2022:        Procedures    Assessment and Plan   Diagnoses and all orders for this visit:    1. Acute pain of right shoulder (Primary)  Assessment & Plan:  - Discussed possible etiologies, right rotator cuff tendinitis versus musculoskeletal.  Advised patient to limit right shoulder strain at work if possible.  Demonstrated different shoulder stretches he could do at home daily.  Informed patient he could try lidocaine patches and/or Voltaren gel.  Ibuprofen as needed.  Cooing/Heating.  -Ketorolac shot in office.   -Flexeril to use as needed.  Informed patient medication may cause drowsiness.  -We will get x-ray of right shoulder to rule out any minor fractures or dislocations.  -Informed patient to monitor symptoms, should improve within the next 4 to 6 weeks.  If symptoms do not resolve or drastically progressed please return to clinic, will consider referral to physical therapy or possibly MRI.    Orders:  -     XR Shoulder 2+ View Right  -     ketorolac (TORADOL) injection 30 mg    Other orders  -     cyclobenzaprine (FLEXERIL) 5 MG tablet; Take 1 tablet by mouth 3 (Three) Times a Day As Needed for Muscle Spasms.  Dispense: 30 tablet; Refill: 0        Follow Up   Return if symptoms worsen or fail to improve.  Patient was given instructions and counseling regarding his condition or for health maintenance advice. Please see  specific information pulled into the AVS if appropriate.       Answers for HPI/ROS submitted by the patient on 12/8/2022  What is the primary reason for your visit?: Other  Please describe your symptoms.: Right shoulder and upper arm pain  Have you had these symptoms before?: No  How long have you been having these symptoms?: Greater than 2 weeks  Please list any medications you are currently taking for this condition.: Tylenol

## 2022-12-09 ENCOUNTER — HOSPITAL ENCOUNTER (OUTPATIENT)
Dept: GENERAL RADIOLOGY | Facility: HOSPITAL | Age: 37
Discharge: HOME | End: 2022-12-09

## 2022-12-09 PROCEDURE — 73030 X-RAY EXAM OF SHOULDER: CPT

## 2022-12-13 ENCOUNTER — TELEPHONE (OUTPATIENT)
Dept: INTERNAL MEDICINE | Facility: CLINIC | Age: 37
End: 2022-12-13

## 2022-12-13 NOTE — TELEPHONE ENCOUNTER
I attempted to call the patient and I was not able to leave a message as no voicemail was set up    HUB please read    Caty Chauhan PA-C   12/13/2022  8:24 AM EST Back to Top      Xray of shoulder looks normal. No dislocation or fracture.

## 2022-12-13 NOTE — TELEPHONE ENCOUNTER
----- Message from Caty Chauhan PA-C sent at 12/13/2022  8:24 AM EST -----  Xray of shoulder looks normal. No dislocation or fracture.

## 2023-01-10 ENCOUNTER — OFFICE VISIT (OUTPATIENT)
Dept: INTERNAL MEDICINE | Facility: CLINIC | Age: 38
End: 2023-01-10
Payer: MEDICAID

## 2023-01-10 ENCOUNTER — TELEPHONE (OUTPATIENT)
Dept: INTERNAL MEDICINE | Facility: CLINIC | Age: 38
End: 2023-01-10

## 2023-01-10 VITALS
SYSTOLIC BLOOD PRESSURE: 109 MMHG | OXYGEN SATURATION: 98 % | RESPIRATION RATE: 15 BRPM | BODY MASS INDEX: 32.13 KG/M2 | TEMPERATURE: 96.3 F | DIASTOLIC BLOOD PRESSURE: 76 MMHG | WEIGHT: 237.2 LBS | HEART RATE: 75 BPM | HEIGHT: 72 IN

## 2023-01-10 DIAGNOSIS — M25.511 ACUTE PAIN OF RIGHT SHOULDER: Primary | ICD-10-CM

## 2023-01-10 DIAGNOSIS — E11.65 TYPE 2 DIABETES MELLITUS WITH HYPERGLYCEMIA, WITHOUT LONG-TERM CURRENT USE OF INSULIN: ICD-10-CM

## 2023-01-10 DIAGNOSIS — I10 ESSENTIAL HYPERTENSION: ICD-10-CM

## 2023-01-10 DIAGNOSIS — F41.9 ANXIETY: ICD-10-CM

## 2023-01-10 DIAGNOSIS — E78.5 HYPERLIPIDEMIA, UNSPECIFIED HYPERLIPIDEMIA TYPE: ICD-10-CM

## 2023-01-10 LAB
ALBUMIN SERPL-MCNC: 4.8 G/DL (ref 3.5–5.2)
ALBUMIN UR-MCNC: <1.2 MG/DL
ALBUMIN/GLOB SERPL: 1.7 G/DL
ALP SERPL-CCNC: 60 U/L (ref 39–117)
ALT SERPL W P-5'-P-CCNC: 28 U/L (ref 1–41)
ANION GAP SERPL CALCULATED.3IONS-SCNC: 12.1 MMOL/L (ref 5–15)
AST SERPL-CCNC: 18 U/L (ref 1–40)
BASOPHILS # BLD AUTO: 0.07 10*3/MM3 (ref 0–0.2)
BASOPHILS NFR BLD AUTO: 0.7 % (ref 0–1.5)
BILIRUB SERPL-MCNC: 1 MG/DL (ref 0–1.2)
BUN SERPL-MCNC: 16 MG/DL (ref 6–20)
BUN/CREAT SERPL: 18.2 (ref 7–25)
CALCIUM SPEC-SCNC: 9.7 MG/DL (ref 8.6–10.5)
CHLORIDE SERPL-SCNC: 101 MMOL/L (ref 98–107)
CHOLEST SERPL-MCNC: 157 MG/DL (ref 0–200)
CO2 SERPL-SCNC: 23.9 MMOL/L (ref 22–29)
CREAT SERPL-MCNC: 0.88 MG/DL (ref 0.76–1.27)
DEPRECATED RDW RBC AUTO: 41.7 FL (ref 37–54)
EGFRCR SERPLBLD CKD-EPI 2021: 115 ML/MIN/1.73
EOSINOPHIL # BLD AUTO: 0.1 10*3/MM3 (ref 0–0.4)
EOSINOPHIL NFR BLD AUTO: 1 % (ref 0.3–6.2)
ERYTHROCYTE [DISTWIDTH] IN BLOOD BY AUTOMATED COUNT: 13.4 % (ref 12.3–15.4)
GLOBULIN UR ELPH-MCNC: 2.8 GM/DL
GLUCOSE SERPL-MCNC: 113 MG/DL (ref 65–99)
HBA1C MFR BLD: 6.9 % (ref 4.8–5.6)
HCT VFR BLD AUTO: 50.4 % (ref 37.5–51)
HDLC SERPL-MCNC: 25 MG/DL (ref 40–60)
HGB BLD-MCNC: 16.7 G/DL (ref 13–17.7)
IMM GRANULOCYTES # BLD AUTO: 0.04 10*3/MM3 (ref 0–0.05)
IMM GRANULOCYTES NFR BLD AUTO: 0.4 % (ref 0–0.5)
LDLC SERPL CALC-MCNC: 95 MG/DL (ref 0–100)
LDLC/HDLC SERPL: 3.58 {RATIO}
LYMPHOCYTES # BLD AUTO: 2.24 10*3/MM3 (ref 0.7–3.1)
LYMPHOCYTES NFR BLD AUTO: 23.5 % (ref 19.6–45.3)
MCH RBC QN AUTO: 28.7 PG (ref 26.6–33)
MCHC RBC AUTO-ENTMCNC: 33.1 G/DL (ref 31.5–35.7)
MCV RBC AUTO: 86.7 FL (ref 79–97)
MONOCYTES # BLD AUTO: 0.55 10*3/MM3 (ref 0.1–0.9)
MONOCYTES NFR BLD AUTO: 5.8 % (ref 5–12)
NEUTROPHILS NFR BLD AUTO: 6.55 10*3/MM3 (ref 1.7–7)
NEUTROPHILS NFR BLD AUTO: 68.6 % (ref 42.7–76)
NRBC BLD AUTO-RTO: 0 /100 WBC (ref 0–0.2)
PLATELET # BLD AUTO: 228 10*3/MM3 (ref 140–450)
PMV BLD AUTO: 11.1 FL (ref 6–12)
POTASSIUM SERPL-SCNC: 4.5 MMOL/L (ref 3.5–5.2)
PROT SERPL-MCNC: 7.6 G/DL (ref 6–8.5)
RBC # BLD AUTO: 5.81 10*6/MM3 (ref 4.14–5.8)
SODIUM SERPL-SCNC: 137 MMOL/L (ref 136–145)
TRIGL SERPL-MCNC: 212 MG/DL (ref 0–150)
TSH SERPL DL<=0.05 MIU/L-ACNC: 2.73 UIU/ML (ref 0.27–4.2)
VLDLC SERPL-MCNC: 37 MG/DL (ref 5–40)
WBC NRBC COR # BLD: 9.55 10*3/MM3 (ref 3.4–10.8)

## 2023-01-10 PROCEDURE — 80050 GENERAL HEALTH PANEL: CPT | Performed by: NURSE PRACTITIONER

## 2023-01-10 PROCEDURE — 99214 OFFICE O/P EST MOD 30 MIN: CPT | Performed by: NURSE PRACTITIONER

## 2023-01-10 PROCEDURE — 80061 LIPID PANEL: CPT | Performed by: NURSE PRACTITIONER

## 2023-01-10 PROCEDURE — 82043 UR ALBUMIN QUANTITATIVE: CPT | Performed by: NURSE PRACTITIONER

## 2023-01-10 PROCEDURE — 83036 HEMOGLOBIN GLYCOSYLATED A1C: CPT | Performed by: NURSE PRACTITIONER

## 2023-01-10 NOTE — PROGRESS NOTES
Chief Complaint  Follow-up (Shoulder pain) and Diabetes    Subjective         Matt Albarran presents to [facility]  Shoulder pain-  Patient evaluated in clinic one month ago for shoulder pain. Xray with mild degeneration of AC joint. Patient states that he did notice an improvement in his mobility over the first two week after his visit, however he is now having more trouble using the shoulder because of the pain. Patient states his shoulder is okay at rest, pain is exacerbated by movement. States his arm gets fatigued very quickly. Describes pain as sharp. Pain is constant but varies in intensity. Patient states pain did not start until he got a tetanus shot in that same arm. Denies direct injury.     DM2-  Managed with Jardiance and metformin. Most recent A1c 8.24. Patient has not returned for follow up since this reading which was over one year ago. Patient states he has been doing much better about taking the metformin twice a day. He does not check his blood glucose levels at home. Urine microalbumin: 1/2022. Diabetic foot exam: Due. Diabetic eye exam: 2022, Dr. Sears's in [place]. Pneumonia vaccination: Up to date. Renal protection: ACE.     HTN-  Managed with Lisinopril. He does not check blood pressure at home. Denies chest pain, blurry vision, headache, leg swelling.     HLD-  Managed with statin. Most recent LDL 64.      Anxiety-  Patient states he did not establish with a therapist as discussed at his last visit. States his stress is mostly just daily life. Tried treating with Buspar which gave him vertigo. Also tried an OTC supplement that did not improve his stress level. He feels like is managing his stress well. Has four girls ranging ages 11 to 4. Denies SI/HI.      Influenza vaccination: Declines  COVID19 vaccination: Declines  PSA: Denies family history of prostate cancer  Colonoscopy: Denies family history of colon cancer       Objective      Vitals:    01/10/23 0807   BP: 109/76   BP Location: Right arm   Patient Position: Sitting   Cuff Size: Adult   Pulse: 75   Resp: 15   Temp: 96.3 °F (35.7 °C)   SpO2: 98%   Weight: 108 kg (237 lb 3.2 oz)   Height: 182.9 cm (72\")      Body mass index is 32.17 kg/m².    Wt Readings from Last 3 Encounters:   01/10/23 108 kg (237 lb 3.2 oz)   12/08/22 107 kg (236 lb)   09/16/22 107 kg (236 lb 9.6 oz)     BP Readings from Last 3 Encounters:   01/10/23 109/76   12/08/22 122/72   09/16/22 133/73                Physical Exam  Constitutional:       Appearance: Normal appearance.   HENT:      Head: Normocephalic and atraumatic.      Nose: Nose normal.      Mouth/Throat:      Mouth: Mucous membranes are moist.      Pharynx: Oropharynx is clear.   Eyes:      Extraocular Movements: Extraocular movements intact.      Conjunctiva/sclera: Conjunctivae normal.      Pupils: Pupils are equal, round, and reactive to light.   Neck:      Thyroid: No thyroid mass, thyromegaly or thyroid tenderness.   Cardiovascular:      Rate and Rhythm: Normal rate and regular rhythm.      Heart sounds: Normal heart sounds.   Pulmonary:      Effort: Pulmonary effort is normal.      Breath sounds: Normal breath sounds.   Musculoskeletal:        Feet:    Skin:     General: Skin is warm and dry.   Neurological:      General: No focal deficit present.      Mental Status: He is alert and oriented to person, place, and time.   Psychiatric:         Mood and Affect: Mood normal.         Behavior: Behavior normal.         Thought Content: Thought content normal.          Result Review :   The following data was reviewed by: ELIO Berger on 01/10/2023:      Procedures    Assessment and Plan   Diagnoses and all orders for this visit:    1. Acute pain of right shoulder (Primary)  Assessment & Plan:  Discussed further interventions, including PT and/or orthopedic referral. Patient would like to proceed with PT and may consider orthopedics in the future.  Continue conservative care, including ice/heat, stretching as tolerated. Voltaren gel to pharmacy. May consider MRI in the future if warranted.     Orders:  -     Ambulatory Referral to Physical Therapy Evaluate and treat    2. Type 2 diabetes mellitus with hyperglycemia, without long-term current use of insulin ([facility])  Assessment & Plan:  Poorly controlled on previous labs, A1c 8.24. Continue metformin and Jardiance for now, repeat A1c today. Will determine if GLP1 is warranted based on results of labs.  Encouraged patient to continue to monitor blood glucose levels and to call with consistent elevations. Urine microalbumin: Today. Diabetic foot exam: Today. Diabetic eye exam: 2022, records requested. Pneumonia vaccination: Up to date. Renal protection: ACE.      Orders:  -     CBC & Differential  -     Comprehensive Metabolic Panel  -     Lipid Panel  -     TSH  -     MicroAlbumin, Urine, Random - Urine, Clean Catch  -     Hemoglobin A1c    3. Essential hypertension  Assessment & Plan:  Well controlled, continue current medications. Encouraged patient to continue to monitor blood pressure at home and to call or return to clinic with consistent elevations greater than 130/80. Labs in clinic today.        4. Hyperlipidemia, unspecified hyperlipidemia type  Assessment & Plan:  Well controlled, continue statin. Most recent LDL 64. Lipid panel with labs.        5. Anxiety  Assessment & Plan:  Patient doing well currently. He will monitor closely and should seek medical attention immediately if he feels that his mental health is deteriorating.       Other orders  -     glucose blood (ReliOn True Metrix Test Strips) test strip; Use as instructed  Dispense: 100 each; Refill: 1  -     Diclofenac Sodium (Voltaren) 1 % gel gel; Apply 4 g topically to the appropriate area as directed 4 (Four) Times a Day As Needed (hand pain).  Dispense: 100 g; Refill: 1        Follow Up   Return in about 6 weeks (around 2/21/2023).  Patient  was given instructions and counseling regarding his condition or for health maintenance advice. Please see specific information pulled into the AVS if appropriate.

## 2023-01-10 NOTE — ASSESSMENT & PLAN NOTE
Well controlled, continue current medications. Encouraged patient to continue to monitor blood pressure at home and to call or return to clinic with consistent elevations greater than 130/80. Labs in clinic today.

## 2023-01-10 NOTE — ASSESSMENT & PLAN NOTE
Discussed further interventions, including PT and/or orthopedic referral. Patient would like to proceed with PT and may consider orthopedics in the future. Continue conservative care, including ice/heat, stretching as tolerated. Voltaren gel to pharmacy. May consider MRI in the future if warranted.

## 2023-01-10 NOTE — ASSESSMENT & PLAN NOTE
Patient doing well currently. He will monitor closely and should seek medical attention immediately if he feels that his mental health is deteriorating.

## 2023-01-10 NOTE — ASSESSMENT & PLAN NOTE
Poorly controlled on previous labs, A1c 8.24. Continue metformin and Jardiance for now, repeat A1c today. Will determine if GLP1 is warranted based on results of labs.  Encouraged patient to continue to monitor blood glucose levels and to call with consistent elevations. Urine microalbumin: Today. Diabetic foot exam: Today. Diabetic eye exam: 2022, records requested. Pneumonia vaccination: Up to date. Renal protection: ACE.

## 2023-01-11 ENCOUNTER — TELEPHONE (OUTPATIENT)
Dept: INTERNAL MEDICINE | Facility: CLINIC | Age: 38
End: 2023-01-11

## 2023-02-14 ENCOUNTER — LAB (OUTPATIENT)
Dept: INTERNAL MEDICINE | Facility: CLINIC | Age: 38
End: 2023-02-14
Payer: MEDICAID

## 2023-02-14 DIAGNOSIS — R35.0 FREQUENCY OF URINATION: Primary | ICD-10-CM

## 2023-02-14 LAB
BACTERIA UR QL AUTO: ABNORMAL /HPF
BILIRUB UR QL STRIP: NEGATIVE
CLARITY UR: CLEAR
COLOR UR: YELLOW
GLUCOSE UR STRIP-MCNC: ABNORMAL MG/DL
HGB UR QL STRIP.AUTO: NEGATIVE
HYALINE CASTS UR QL AUTO: ABNORMAL /LPF
KETONES UR QL STRIP: NEGATIVE
LEUKOCYTE ESTERASE UR QL STRIP.AUTO: NEGATIVE
NITRITE UR QL STRIP: NEGATIVE
PH UR STRIP.AUTO: 5.5 [PH] (ref 5–8)
PROT UR QL STRIP: NEGATIVE
RBC # UR STRIP: ABNORMAL /HPF
REF LAB TEST METHOD: ABNORMAL
SP GR UR STRIP: 1.01 (ref 1–1.03)
SQUAMOUS #/AREA URNS HPF: ABNORMAL /HPF
UROBILINOGEN UR QL STRIP: ABNORMAL
WBC # UR STRIP: ABNORMAL /HPF

## 2023-02-14 PROCEDURE — 87086 URINE CULTURE/COLONY COUNT: CPT

## 2023-02-14 PROCEDURE — 81001 URINALYSIS AUTO W/SCOPE: CPT

## 2023-02-16 LAB — BACTERIA SPEC AEROBE CULT: NO GROWTH

## 2023-03-27 ENCOUNTER — OFFICE VISIT (OUTPATIENT)
Dept: INTERNAL MEDICINE | Facility: CLINIC | Age: 38
End: 2023-03-27
Payer: MEDICAID

## 2023-03-27 VITALS
SYSTOLIC BLOOD PRESSURE: 130 MMHG | HEART RATE: 73 BPM | DIASTOLIC BLOOD PRESSURE: 70 MMHG | TEMPERATURE: 97.7 F | HEIGHT: 72 IN | OXYGEN SATURATION: 97 % | WEIGHT: 240 LBS | RESPIRATION RATE: 16 BRPM | BODY MASS INDEX: 32.51 KG/M2

## 2023-03-27 DIAGNOSIS — M25.511 LOCALIZED PAIN OF RIGHT SHOULDER JOINT: Primary | ICD-10-CM

## 2023-03-27 PROCEDURE — 99213 OFFICE O/P EST LOW 20 MIN: CPT

## 2023-03-27 PROCEDURE — 3044F HG A1C LEVEL LT 7.0%: CPT

## 2023-03-27 PROCEDURE — 1159F MED LIST DOCD IN RCRD: CPT

## 2023-03-27 PROCEDURE — 3078F DIAST BP <80 MM HG: CPT

## 2023-03-27 PROCEDURE — 3075F SYST BP GE 130 - 139MM HG: CPT

## 2023-03-27 PROCEDURE — 1160F RVW MEDS BY RX/DR IN RCRD: CPT

## 2023-03-27 NOTE — PROGRESS NOTES
"Chief Complaint  Shoulder Pain    Subjective      Matt Albarran presents to [facility]  History of Present Illness    Matt is here for continued shoulder pain. Denies any known provoking injury.   He said the pain develop shortly after receiving a Tdap injection in this spot.   He has been seen multiple times for this shoulder pain.   Xray was done in December which showed some mild AC joint degeneration.   He then went through PT for about two months.   Part of the discomfort went away, but the main focus of pain hasn't changed - pin point on the joint.   Most of the time, with use is when the pain is severe. Outward movements hurt.   Picking up his kids makes the pain come on.   He will use diclofenac gel as needed - not using the flexeril.   Pain is not severe most days, but its uncomfortable.     Current Outpatient Medications   Medication Instructions   • atorvastatin (LIPITOR) 40 mg, Oral, Daily   • cyclobenzaprine (FLEXERIL) 5 mg, Oral, 3 Times Daily PRN   • Diclofenac Sodium (VOLTAREN) 4 g, Topical, 4 Times Daily PRN   • empagliflozin (JARDIANCE) 25 mg, Oral, Daily   • glucose blood (ReliOn True Metrix Test Strips) test strip Use as instructed   • lisinopril (PRINIVIL,ZESTRIL) 20 mg, Oral, Daily   • metFORMIN (GLUCOPHAGE) 1,000 mg, Oral, 2 Times Daily With Meals       The following portions of the patient's history were reviewed and updated as appropriate: allergies, current medications, past family history, past medical history, past social history, past surgical history, and problem list.    Objective   Vital Signs:   /70   Pulse 73   Temp 97.7 °F (36.5 °C)   Resp 16   Ht 182.9 cm (72\")   Wt 109 kg (240 lb)   SpO2 97%   BMI 32.55 kg/m²     Wt Readings from Last 3 Encounters:   03/27/23 109 kg (240 lb)   01/10/23 108 kg (237 lb 3.2 oz)   12/08/22 107 kg (236 lb)     BP Readings from Last 3 Encounters:   03/27/23 130/70   01/10/23 109/76 "   12/08/22 122/72     Physical Exam  Vitals reviewed.   Constitutional:       Appearance: Normal appearance. He is well-developed.   HENT:      Head: Normocephalic and atraumatic.      Mouth/Throat:      Pharynx: No oropharyngeal exudate.   Eyes:      Conjunctiva/sclera: Conjunctivae normal.      Pupils: Pupils are equal, round, and reactive to light.   Cardiovascular:      Rate and Rhythm: Normal rate.   Pulmonary:      Effort: Pulmonary effort is normal.   Musculoskeletal:      Right shoulder: Tenderness present. Decreased range of motion. Decreased strength. Normal pulse.   Skin:     General: Skin is warm and dry.   Neurological:      Mental Status: He is alert and oriented to person, place, and time.   Psychiatric:         Mood and Affect: Affect normal.          Result Review :  The following data was reviewed by: ELIO Bardales on 03/27/2023:      Common labs    Common Labs 1/10/23 1/10/23 1/10/23 1/10/23 1/10/23    0915 0915 0915 0915 0915   Glucose    113 (A)    BUN    16    Creatinine    0.88    Sodium    137    Potassium    4.5    Chloride    101    Calcium    9.7    Albumin    4.8    Total Bilirubin    1.0    Alkaline Phosphatase    60    AST (SGOT)    18    ALT (SGPT)    28    WBC 9.55       Hemoglobin 16.7       Hematocrit 50.4       Platelets 228       Total Cholesterol     157   Triglycerides     212 (A)   HDL Cholesterol     25 (A)   LDL Cholesterol      95   Hemoglobin A1C   6.90 (A)     Microalbumin, Urine  <1.2      (A) Abnormal value              Lab Results (last 72 hours)     ** No results found for the last 72 hours. **           XR Shoulder 2+ View Right    Result Date: 12/9/2022   Mild acromioclavicular joint degeneration.       DENI FRANCO MD       Electronically Signed and Approved By: DENI FRANCO MD on 12/09/2022 at 14:21               Lab Results   Component Value Date    COVID19 Detected (C) 01/07/2022    FLU Negative 01/07/2022    FLU Negative 01/07/2022    BILIRUBINUR  Negative 02/14/2023       Procedures        Assessment and Plan   Diagnoses and all orders for this visit:    1. Localized pain of right shoulder joint (Primary)  Comments:  We will proceed with MRI of right shoulder with Ortho referral following.  Patient has utilized conservative measures and PT without improvement.  Orders:  -     MRI Shoulder Right Without Contrast; Future          There are no discontinued medications.       Follow Up   Return in about 6 weeks (around 5/8/2023) for Annual physical.  Patient was given instructions and counseling regarding his condition or for health maintenance advice. Please see specific information pulled into the AVS if appropriate.       ELIO Bardales  03/27/23  10:05 EDT

## 2023-04-11 ENCOUNTER — TELEPHONE (OUTPATIENT)
Dept: INTERNAL MEDICINE | Facility: CLINIC | Age: 38
End: 2023-04-11
Payer: MEDICAID

## 2023-04-11 NOTE — TELEPHONE ENCOUNTER
Caller: Matt Albarran    Relationship to patient: Self    Best call back number: [phone]    Patient is needing: PATIENT IS WANTING TO KNOW ABOUT PHYSICAL THERAPY. HE HAS HIS LAST ONE ON THURSDAY. PHYSICAL THERAPY IS ASKING IF HE IS GOING TO CONTINUE. PLEASE CALL AND ADVISE.

## 2023-04-25 RX ORDER — LISINOPRIL 20 MG/1
20 TABLET ORAL DAILY
Qty: 90 TABLET | Refills: 1 | Status: SHIPPED | OUTPATIENT
Start: 2023-04-25 | End: 2023-07-24

## 2023-04-25 NOTE — TELEPHONE ENCOUNTER
Caller: NITESH FERRARO    Relationship: Emergency Contact    Best call back number: [phone]    Requested Prescriptions:   Requested Prescriptions     Pending Prescriptions Disp Refills   • lisinopril (PRINIVIL,ZESTRIL) 20 MG tablet 90 tablet 1     Sig: Take 1 tablet by mouth Daily for 90 days.   • empagliflozin (Jardiance) 25 MG tablet tablet 90 tablet 1     Sig: Take 1 tablet by mouth Daily.   • metFORMIN (GLUCOPHAGE) 1000 MG tablet 180 tablet 1     Sig: Take 1 tablet by mouth 2 (Two) Times a Day With Meals for 90 days.        Pharmacy where request should be sent: [address] - [address] [facility] - [phone] [facility] [phone] FX     Last office visit with prescribing clinician: 1/10/2023   Last telemedicine visit with prescribing clinician: Visit date not found   Next office visit with prescribing clinician: Visit date not found     Does the patient have less than a 3 day supply:  [x] Yes  [] No    Would you like a call back once the refill request has been completed: [x] Yes [] No    If the office needs to give you a call back, can they leave a voicemail: [x] Yes [] No    Rosanne Feliz Rep   04/25/23 11:03 EDT

## 2023-05-01 ENCOUNTER — HOSPITAL ENCOUNTER (OUTPATIENT)
Dept: MRI IMAGING | Facility: HOSPITAL | Age: 38
Discharge: HOME | End: 2023-05-01
Payer: MEDICAID

## 2023-05-01 DIAGNOSIS — M25.511 LOCALIZED PAIN OF RIGHT SHOULDER JOINT: ICD-10-CM

## 2023-05-01 PROCEDURE — 73221 MRI JOINT UPR EXTREM W/O DYE: CPT

## 2023-06-22 PROBLEM — E66.09 CLASS 1 OBESITY DUE TO EXCESS CALORIES WITH SERIOUS COMORBIDITY AND BODY MASS INDEX (BMI) OF 32.0 TO 32.9 IN ADULT: Status: ACTIVE | Noted: 2023-06-22

## 2023-06-22 PROBLEM — E66.811 CLASS 1 OBESITY DUE TO EXCESS CALORIES WITH SERIOUS COMORBIDITY AND BODY MASS INDEX (BMI) OF 32.0 TO 32.9 IN ADULT: Status: ACTIVE | Noted: 2023-06-22

## 2023-08-04 RX ORDER — ATORVASTATIN CALCIUM 40 MG/1
TABLET, FILM COATED ORAL
Qty: 90 TABLET | Refills: 0 | Status: SHIPPED | OUTPATIENT
Start: 2023-08-04

## 2023-12-15 RX ORDER — EMPAGLIFLOZIN 25 MG/1
25 TABLET, FILM COATED ORAL DAILY
Qty: 90 TABLET | Refills: 0 | Status: SHIPPED | OUTPATIENT
Start: 2023-12-15

## 2023-12-15 RX ORDER — LISINOPRIL 20 MG/1
20 TABLET ORAL DAILY
Qty: 90 TABLET | Refills: 0 | Status: SHIPPED | OUTPATIENT
Start: 2023-12-15 | End: 2024-03-14

## 2023-12-15 RX ORDER — ATORVASTATIN CALCIUM 40 MG/1
TABLET, FILM COATED ORAL
Qty: 90 TABLET | Refills: 0 | Status: SHIPPED | OUTPATIENT
Start: 2023-12-15

## 2024-01-18 ENCOUNTER — NURSE TRIAGE (OUTPATIENT)
Dept: CALL CENTER | Facility: HOSPITAL | Age: 39
End: 2024-01-18
Payer: MEDICAID

## 2024-01-18 NOTE — TELEPHONE ENCOUNTER
"Caller states having palpitations for two weeks sometimes coming and staying for a significant amount of time. Caller denies use of energy drinks and states only one cup of coffee a day. Caller denies diaphoresis. Caller c/o not feeling well and having discomfort in upper middle abdominal area. Caller denies Chest Pain but states has had some dyspnea with exertion lately. Caller reports pulse 87. Caller does report history of HTN, DM and High Cholesterol. Caller advised per guideline and aware  to upgrade and call 911 should become worse in route.             Reason for Disposition   Difficulty breathing    Additional Information   Negative: Passed out (i.e., lost consciousness, collapsed and was not responding)   Negative: Shock suspected (e.g., cold/pale/clammy skin, too weak to stand, low BP, rapid pulse)   Negative: Difficult to awaken or acting confused (e.g., disoriented, slurred speech)   Negative: Visible sweat on face or sweat dripping down face   Negative: Unable to walk, or can only walk with assistance (e.g., requires support)   Negative: [1] Received SHOCK from implantable cardiac defibrillator AND [2] persisting symptoms (i.e., palpitations, lightheadedness)   Negative: [1] Dizziness, lightheadedness, or weakness AND [2] heart beating very rapidly (e.g., > 140 / minute)   Negative: [1] Dizziness, lightheadedness, or weakness AND [2] heart beating very slowly (e.g., < 50 / minute)   Negative: Sounds like a life-threatening emergency to the triager   Negative: Chest pain   Negative: Implantable Cardiac Defibrillator (ICD) or a pacemaker symptoms or questions    Answer Assessment - Initial Assessment Questions  1. DESCRIPTION: \"Please describe your heart rate or heartbeat that you are having\" (e.g., fast/slow, regular/irregular, skipped or extra beats, \"palpitations\")      Palpitations, states discomfort in upper abdominal area last two weeks, upset stomach   2. ONSET: \"When did it start?\" (Minutes, hours " "or days)       Last week   3. DURATION: \"How long does it last\" (e.g., seconds, minutes, hours)       All the time he states   4. PATTERN \"Does it come and go, or has it been constant since it started?\"  \"Does it get worse with exertion?\"   \"Are you feeling it now?\"      Constant   5. TAP: \"Using your hand, can you tap out what you are feeling on a chair or table in front of you, so that I can hear?\" (Note: not all patients can do this)          6. HEART RATE: \"Can you tell me your heart rate?\" \"How many beats in 15 seconds?\"  (Note: not all patients can do this)        87  7. RECURRENT SYMPTOM: \"Have you ever had this before?\" If Yes, ask: \"When was the last time?\" and \"What happened that time?\"       Denies   8. CAUSE: \"What do you think is causing the palpitations?\"       Denies   9. CARDIAC HISTORY: \"Do you have any history of heart disease?\" (e.g., heart attack, angina, bypass surgery, angioplasty, arrhythmia)       History of DM and HTN and high cholesterol   10. OTHER SYMPTOMS: \"Do you have any other symptoms?\" (e.g., dizziness, chest pain, sweating, difficulty breathing)        States shortness of breath but he's working,   11. PREGNANCY: \"Is there any chance you are pregnant?\" \"When was your last menstrual period?\"    Protocols used: Heart Rate and Heartbeat Questions-ADULT-    "

## 2024-01-19 ENCOUNTER — APPOINTMENT (OUTPATIENT)
Dept: GENERAL RADIOLOGY | Facility: HOSPITAL | Age: 39
End: 2024-01-19
Payer: MEDICAID

## 2024-01-19 ENCOUNTER — HOSPITAL ENCOUNTER (EMERGENCY)
Facility: HOSPITAL | Age: 39
Discharge: HOME | End: 2024-01-19
Attending: EMERGENCY MEDICINE
Payer: MEDICAID

## 2024-01-19 VITALS
HEIGHT: 72 IN | SYSTOLIC BLOOD PRESSURE: 119 MMHG | WEIGHT: 247.58 LBS | HEART RATE: 87 BPM | DIASTOLIC BLOOD PRESSURE: 77 MMHG | OXYGEN SATURATION: 93 % | BODY MASS INDEX: 33.53 KG/M2 | RESPIRATION RATE: 18 BRPM | TEMPERATURE: 98.3 F

## 2024-01-19 DIAGNOSIS — R00.2 PALPITATIONS: Primary | ICD-10-CM

## 2024-01-19 LAB
ALBUMIN SERPL-MCNC: 4.4 G/DL (ref 3.5–5.2)
ALBUMIN/GLOB SERPL: 1.7 G/DL
ALP SERPL-CCNC: 57 U/L (ref 39–117)
ALT SERPL W P-5'-P-CCNC: 31 U/L (ref 1–41)
ANION GAP SERPL CALCULATED.3IONS-SCNC: 13.6 MMOL/L (ref 5–15)
AST SERPL-CCNC: 20 U/L (ref 1–40)
BASOPHILS # BLD AUTO: 0.06 10*3/MM3 (ref 0–0.2)
BASOPHILS NFR BLD AUTO: 0.9 % (ref 0–1.5)
BILIRUB SERPL-MCNC: 0.7 MG/DL (ref 0–1.2)
BUN SERPL-MCNC: 17 MG/DL (ref 6–20)
BUN/CREAT SERPL: 17.2 (ref 7–25)
CALCIUM SPEC-SCNC: 9.6 MG/DL (ref 8.6–10.5)
CHLORIDE SERPL-SCNC: 101 MMOL/L (ref 98–107)
CO2 SERPL-SCNC: 23.4 MMOL/L (ref 22–29)
CREAT SERPL-MCNC: 0.99 MG/DL (ref 0.76–1.27)
DEPRECATED RDW RBC AUTO: 40 FL (ref 37–54)
EGFRCR SERPLBLD CKD-EPI 2021: 101.2 ML/MIN/1.73
EOSINOPHIL # BLD AUTO: 0.11 10*3/MM3 (ref 0–0.4)
EOSINOPHIL NFR BLD AUTO: 1.6 % (ref 0.3–6.2)
ERYTHROCYTE [DISTWIDTH] IN BLOOD BY AUTOMATED COUNT: 12.8 % (ref 12.3–15.4)
GLOBULIN UR ELPH-MCNC: 2.6 GM/DL
GLUCOSE SERPL-MCNC: 117 MG/DL (ref 65–99)
HCT VFR BLD AUTO: 48 % (ref 37.5–51)
HGB BLD-MCNC: 16 G/DL (ref 13–17.7)
HOLD SPECIMEN: NORMAL
HOLD SPECIMEN: NORMAL
IMM GRANULOCYTES # BLD AUTO: 0.01 10*3/MM3 (ref 0–0.05)
IMM GRANULOCYTES NFR BLD AUTO: 0.1 % (ref 0–0.5)
LYMPHOCYTES # BLD AUTO: 2.24 10*3/MM3 (ref 0.7–3.1)
LYMPHOCYTES NFR BLD AUTO: 32.5 % (ref 19.6–45.3)
MCH RBC QN AUTO: 29 PG (ref 26.6–33)
MCHC RBC AUTO-ENTMCNC: 33.3 G/DL (ref 31.5–35.7)
MCV RBC AUTO: 87 FL (ref 79–97)
MONOCYTES # BLD AUTO: 0.42 10*3/MM3 (ref 0.1–0.9)
MONOCYTES NFR BLD AUTO: 6.1 % (ref 5–12)
NEUTROPHILS NFR BLD AUTO: 4.05 10*3/MM3 (ref 1.7–7)
NEUTROPHILS NFR BLD AUTO: 58.8 % (ref 42.7–76)
NRBC BLD AUTO-RTO: 0 /100 WBC (ref 0–0.2)
NT-PROBNP SERPL-MCNC: <36 PG/ML (ref 0–450)
PLATELET # BLD AUTO: 208 10*3/MM3 (ref 140–450)
PMV BLD AUTO: 10.4 FL (ref 6–12)
POTASSIUM SERPL-SCNC: 4.2 MMOL/L (ref 3.5–5.2)
PROT SERPL-MCNC: 7 G/DL (ref 6–8.5)
QT INTERVAL: 367 MS
QTC INTERVAL: 412 MS
RBC # BLD AUTO: 5.52 10*6/MM3 (ref 4.14–5.8)
SODIUM SERPL-SCNC: 138 MMOL/L (ref 136–145)
TROPONIN T SERPL HS-MCNC: 12 NG/L
WBC NRBC COR # BLD AUTO: 6.89 10*3/MM3 (ref 3.4–10.8)
WHOLE BLOOD HOLD COAG: NORMAL
WHOLE BLOOD HOLD SPECIMEN: NORMAL

## 2024-01-19 PROCEDURE — 83880 ASSAY OF NATRIURETIC PEPTIDE: CPT

## 2024-01-19 PROCEDURE — 80053 COMPREHEN METABOLIC PANEL: CPT

## 2024-01-19 PROCEDURE — 99284 EMERGENCY DEPT VISIT MOD MDM: CPT

## 2024-01-19 PROCEDURE — 84484 ASSAY OF TROPONIN QUANT: CPT

## 2024-01-19 PROCEDURE — 93005 ELECTROCARDIOGRAM TRACING: CPT

## 2024-01-19 PROCEDURE — 71045 X-RAY EXAM CHEST 1 VIEW: CPT

## 2024-01-19 PROCEDURE — 85025 COMPLETE CBC W/AUTO DIFF WBC: CPT

## 2024-01-19 RX ORDER — SODIUM CHLORIDE 0.9 % (FLUSH) 0.9 %
10 SYRINGE (ML) INJECTION AS NEEDED
Status: DISCONTINUED | OUTPATIENT
Start: 2024-01-19 | End: 2024-01-19 | Stop reason: HOSPADM

## 2024-01-19 NOTE — ED PROVIDER NOTES
Time: 12:22 PM EST  Date of encounter:  1/19/2024  Independent Historian/Clinical History and Information was obtained by:   Patient    History is limited by: N/A    Chief Complaint: Palpitations      History of Present Illness:  Patient is a 38 y.o. year old male who presents to the emergency department for evaluation of palpitations that began today that seem to take his breath away but has since resolved.  Patient denies chest pain.  Patient denies cough or fever.  Patient states he has a history of anxiety.    HPI    Patient Care Team  Primary Care Provider: Tierra Askew APRN    Past Medical History:     No Known Allergies  Past Medical History:   Diagnosis Date    Diabetes mellitus     Hyperlipidemia     Hypertension      Past Surgical History:   Procedure Laterality Date    VASECTOMY       Family History   Problem Relation Age of Onset    Diabetes Father        Home Medications:  Prior to Admission medications    Medication Sig Start Date End Date Taking? Authorizing Provider   atorvastatin (LIPITOR) 40 MG tablet Take 1 tablet by mouth once daily 12/15/23   Tierra Askew APRN   cyclobenzaprine (FLEXERIL) 5 MG tablet Take 1 tablet by mouth 3 (Three) Times a Day As Needed for Muscle Spasms. 6/22/23   Rafael Fox APRN   diclofenac (VOLTAREN) 75 MG EC tablet Take 1 tablet by mouth 2 (Two) Times a Day. 6/22/23   Rafael Fox APRN   Diclofenac Sodium (Voltaren) 1 % gel gel Apply 4 g topically to the appropriate area as directed 4 (Four) Times a Day As Needed (hand pain). 1/10/23   Tierra Askew APRN   glucose blood (ReliOn True Metrix Test Strips) test strip Use as instructed 1/10/23   Tierra Askew APRN   Jardiance 25 MG tablet tablet Take 1 tablet by mouth once daily 12/15/23   Tierra Askew APRN   lisinopril (PRINIVIL,ZESTRIL) 20 MG tablet Take 1 tablet by mouth once daily for 90 days 12/15/23 3/14/24  Tierra Askew APRN   metFORMIN (GLUCOPHAGE) 1000 MG tablet Take 1 tablet by mouth 2 (Two)  "Times a Day With Meals for 90 days. 4/25/23 7/24/23  Tierra Askew APRN        Social History:   Social History     Tobacco Use    Smoking status: Never    Smokeless tobacco: Never   Vaping Use    Vaping Use: Never used   Substance Use Topics    Alcohol use: Yes     Alcohol/week: 1.0 standard drink of alcohol     Types: 1 Shots of liquor per week     Comment: occaisonally     Drug use: Never         Review of Systems:  Review of Systems   Constitutional:  Negative for chills and fever.   HENT:  Negative for congestion, rhinorrhea and sore throat.    Eyes:  Negative for pain and visual disturbance.   Respiratory:  Negative for apnea, cough, chest tightness and shortness of breath.    Cardiovascular:  Positive for palpitations. Negative for chest pain.   Gastrointestinal:  Negative for abdominal pain, diarrhea, nausea and vomiting.   Genitourinary:  Negative for difficulty urinating and dysuria.   Musculoskeletal:  Negative for joint swelling and myalgias.   Skin:  Negative for color change.   Neurological:  Negative for seizures and headaches.   Psychiatric/Behavioral: Negative.     All other systems reviewed and are negative.       Physical Exam:  /76 (BP Location: Left arm, Patient Position: Sitting)   Pulse 87   Temp 98.3 °F (36.8 °C) (Oral)   Resp 18   Ht 182.9 cm (72\")   Wt 112 kg (247 lb 9.2 oz)   SpO2 93%   BMI 33.58 kg/m²     Physical Exam  Vitals and nursing note reviewed.   Constitutional:       General: He is not in acute distress.     Appearance: Normal appearance. He is not toxic-appearing.   HENT:      Head: Normocephalic and atraumatic.      Jaw: There is normal jaw occlusion.   Eyes:      General: Lids are normal.      Extraocular Movements: Extraocular movements intact.      Conjunctiva/sclera: Conjunctivae normal.      Pupils: Pupils are equal, round, and reactive to light.   Cardiovascular:      Rate and Rhythm: Normal rate and regular rhythm.      Pulses: Normal pulses.      Heart " sounds: Normal heart sounds.   Pulmonary:      Effort: Pulmonary effort is normal. No respiratory distress.      Breath sounds: Normal breath sounds. No wheezing or rhonchi.   Abdominal:      General: Abdomen is flat.      Palpations: Abdomen is soft.      Tenderness: There is no abdominal tenderness. There is no guarding or rebound.   Musculoskeletal:         General: Normal range of motion.      Cervical back: Normal range of motion and neck supple.      Right lower leg: No edema.      Left lower leg: No edema.   Skin:     General: Skin is warm and dry.   Neurological:      Mental Status: He is alert and oriented to person, place, and time. Mental status is at baseline.   Psychiatric:         Mood and Affect: Mood is anxious.                  Procedures:  Procedures      Medical Decision Making:      Comorbidities that affect care:    Hypertension, diabetes    External Notes reviewed:          The following orders were placed and all results were independently analyzed by me:  Orders Placed This Encounter   Procedures    XR Chest 1 View    [place] Draw    Comprehensive Metabolic Panel    BNP    Single High Sensitivity Troponin T    CBC Auto Differential    Ambulatory Referral to Cardiology    Continuous Pulse Oximetry    Vital Signs    Oxygen Therapy- Nasal Cannula; Titrate 1-6 LPM Per SpO2; 90 - 95%    ECG 12 Lead Dyspnea    Insert Peripheral IV    CBC & Differential    Green Top (Gel)    Lavender Top    Gold Top - SST    Light Blue Top       Medications Given in the Emergency Department:  Medications   sodium chloride 0.9 % flush 10 mL (has no administration in time range)        ED Course:         Labs:    Lab Results (last 24 hours)       Procedure Component Value Units Date/Time    CBC & Differential [[id number]]  (Normal) Collected: 01/19/24 1126    Specimen: Blood Updated: 01/19/24 1132    Narrative:      The following orders were created for panel order CBC & Differential.  Procedure                                Abnormality         Status                     ---------                               -----------         ------                     CBC Auto Differential[[id number]]        Normal              Final result                 Please view results for these tests on the individual orders.    Comprehensive Metabolic Panel [[id number]]  (Abnormal) Collected: 01/19/24 1126    Specimen: Blood Updated: 01/19/24 1207     Glucose 117 mg/dL      BUN 17 mg/dL      Creatinine 0.99 mg/dL      Sodium 138 mmol/L      Potassium 4.2 mmol/L      Comment: Slight hemolysis detected by analyzer. Result may be falsely elevated.        Chloride 101 mmol/L      CO2 23.4 mmol/L      Calcium 9.6 mg/dL      Total Protein 7.0 g/dL      Albumin 4.4 g/dL      ALT (SGPT) 31 U/L      AST (SGOT) 20 U/L      Comment: Slight hemolysis detected by analyzer. Result may be falsely elevated.        Alkaline Phosphatase 57 U/L      Total Bilirubin 0.7 mg/dL      Globulin 2.6 gm/dL      A/G Ratio 1.7 g/dL      BUN/Creatinine Ratio 17.2     Anion Gap 13.6 mmol/L      eGFR 101.2 mL/min/1.73     Narrative:      GFR Normal >60  Chronic Kidney Disease <60  Kidney Failure <15      BNP [[id number]]  (Normal) Collected: 01/19/24 1126    Specimen: Blood Updated: 01/19/24 1200     proBNP <36.0 pg/mL     Narrative:      This assay is used as an aid in the diagnosis of individuals suspected of having heart failure. It can be used as an aid in the diagnosis of acute decompensated heart failure (ADHF) in patients presenting with signs and symptoms of ADHF to the emergency department (ED). In addition, NT-proBNP of <300 pg/mL indicates ADHF is not likely.    Age Range Result Interpretation  NT-proBNP Concentration (pg/mL:      <50             Positive            >450                   Gray                 300-450                    Negative             <300    50-75           Positive            >900                  Gray                300-900                  Negative             <300      >75             Positive            >1800                  Gray                300-1800                  Negative            <300    Single High Sensitivity Troponin T [[id number]]  (Normal) Collected: 01/19/24 1126    Specimen: Blood Updated: 01/19/24 1203     HS Troponin T 12 ng/L     Narrative:      High Sensitive Troponin T Reference Range:  <14.0 ng/L- Negative Female for AMI  <22.0 ng/L- Negative Male for AMI  >=14 - Abnormal Female indicating possible myocardial injury.  >=22 - Abnormal Male indicating possible myocardial injury.   Clinicians would have to utilize clinical acumen, EKG, Troponin, and serial changes to determine if it is an Acute Myocardial Infarction or myocardial injury due to an underlying chronic condition.         CBC Auto Differential [[id number]]  (Normal) Collected: 01/19/24 1126    Specimen: Blood Updated: 01/19/24 1132     WBC 6.89 10*3/mm3      RBC 5.52 10*6/mm3      Hemoglobin 16.0 g/dL      Hematocrit 48.0 %      MCV 87.0 fL      MCH 29.0 pg      MCHC 33.3 g/dL      RDW 12.8 %      RDW-SD 40.0 fl      MPV 10.4 fL      Platelets 208 10*3/mm3      Neutrophil % 58.8 %      Lymphocyte % 32.5 %      Monocyte % 6.1 %      Eosinophil % 1.6 %      Basophil % 0.9 %      Immature Grans % 0.1 %      Neutrophils, Absolute 4.05 10*3/mm3      Lymphocytes, Absolute 2.24 10*3/mm3      Monocytes, Absolute 0.42 10*3/mm3      Eosinophils, Absolute 0.11 10*3/mm3      Basophils, Absolute 0.06 10*3/mm3      Immature Grans, Absolute 0.01 10*3/mm3      nRBC 0.0 /100 WBC              Imaging:    XR Chest 1 View    Result Date: 1/19/2024  PROCEDURE: XR CHEST 1 VW  COMPARISON: None  INDICATIONS: SOA  FINDINGS:  Unremarkable cardiomediastinal silhouette.  No focal airspace consolidation, pleural effusion, or pneumothorax.  No acute osseous abnormality.       No acute cardiopulmonary abnormality.       DENI PEDROZA MD       Electronically Signed and Approved By: DENI PEDROZA MD on 1/19/2024 at  11:27                Differential Diagnosis and Discussion:    Palpitations: Differential diagnosis includes but is not limited to anxiety, atrioventricular blocks, mitral valve disease, hypoxia, coronary artery disease, hypokalemia, anemia, fever, COPD, congestive heart failure, pericarditis, Jules-Parkinson-White syndrome, pulmonary embolism, SVT, atrial fibrillation, atrial flutter, sinus tachycardia, thyrotoxicosis, and pheochromocytoma.    All labs were reviewed and interpreted by me.  All X-rays impressions were independently interpreted by me.  EKG was interpreted by supervising attending.    MDM     The patient´s CBC that was reviewed and interpreted by me shows no abnormalities of critical concern. Of note, there is no anemia requiring a blood transfusion and the platelet count is acceptable.  The patient´s CMP that was reviewed and interpretted by me shows no abnormalities of critical concern. Of note, the patient´s sodium and potassium are acceptable. The patient´s liver enzymes are unremarkable. The patient´s renal function (creatinine) is preserved. The patient has a normal anion gap.  Troponin within normal limits.  Chest x-ray shows no acute abnormality.  Patient oxygen saturation is 100% on room air.  Patient is afebrile.  Patient states he has not had 1 episode while in the ED.  Patient's heart score is 1 indicating outpatient follow-up is appropriate.  I will provide ambulatory referral to cardiology for further evaluation.  I instruct patient to return to the ED in the meantime if he developed any worsening symptoms.  Patient states he understands and agrees with plan of care.          Patient Care Considerations:          Consultants/Shared Management Plan:    None    Social Determinants of Health:    Patient is independent, reliable, and has access to care.       Disposition and Care Coordination:    Discharged: The patient is suitable and stable for discharge with no need for consideration of  admission.    I have explained the patient´s condition, diagnoses and treatment plan based on the information available to me at this time. I have answered questions and addressed any concerns. The patient has a good  understanding of the patient´s diagnosis, condition, and treatment plan as can be expected at this point. The vital signs have been stable. The patient´s condition is stable and appropriate for discharge from the emergency department.      The patient will pursue further outpatient evaluation with the primary care physician or other designated or consulting physician as outlined in the discharge instructions. They are agreeable to this plan of care and follow-up instructions have been explained in detail. The patient has received these instructions in written format and have expressed an understanding of the discharge instructions. The patient is aware that any significant change in condition or worsening of symptoms should prompt an immediate return to this or the closest emergency department or call to 911.  I have explained discharge medications and the need for follow up with the patient/caretakers. This was also printed in the discharge instructions. Patient was discharged with the following medications and follow up:      Medication List      No changes were made to your prescriptions during this visit.      Catrachita Dodge MD  [address] [place] [id number]  [phone]    Schedule an appointment as soon as possible for a visit in 1 week  Follow-up       Final diagnoses:   Palpitations        ED Disposition       ED Disposition   Discharge    Condition   Stable    Comment   --               This medical record created using voice recognition software.             Stevie Harvey PA-C  01/19/24 [id number]

## 2024-01-24 LAB
QT INTERVAL: 367 MS
QTC INTERVAL: 412 MS

## 2024-02-21 ENCOUNTER — PATIENT ROUNDING (BHMG ONLY) (OUTPATIENT)
Dept: CARDIOLOGY | Facility: CLINIC | Age: 39
End: 2024-02-21
Payer: MEDICAID

## 2024-02-21 ENCOUNTER — OFFICE VISIT (OUTPATIENT)
Dept: CARDIOLOGY | Facility: CLINIC | Age: 39
End: 2024-02-21
Payer: MEDICAID

## 2024-02-21 VITALS
WEIGHT: 251.2 LBS | SYSTOLIC BLOOD PRESSURE: 102 MMHG | HEART RATE: 86 BPM | DIASTOLIC BLOOD PRESSURE: 70 MMHG | BODY MASS INDEX: 34.02 KG/M2 | HEIGHT: 72 IN

## 2024-02-21 DIAGNOSIS — E78.2 MIXED DYSLIPIDEMIA: ICD-10-CM

## 2024-02-21 DIAGNOSIS — I10 ESSENTIAL HYPERTENSION: ICD-10-CM

## 2024-02-21 DIAGNOSIS — R06.02 SHORTNESS OF BREATH: ICD-10-CM

## 2024-02-21 DIAGNOSIS — R00.2 PALPITATIONS: Primary | ICD-10-CM

## 2024-02-21 DIAGNOSIS — R07.89 CHEST DISCOMFORT: ICD-10-CM

## 2024-02-21 DIAGNOSIS — R29.818 SUSPECTED SLEEP APNEA: ICD-10-CM

## 2024-02-21 NOTE — PROGRESS NOTES
Chief Complaint  Palpitations and Shortness of Breath      History of Present Illness  Matt Albarran presents to [facility] C[facility]    This is a very pleasant 38-year-old gentleman with hypertension, dyslipidemia, diabetes, obesity, presents to clinic for cardiac evaluation.  He has been experiencing sporadic palpitations associated with shortness of breath.  They lessened with reducing his caffeine intake but did not completely resolve.  His palpitations are quite frequent and happen on daily basis.  He denies aggravating or relieving factors.  He has occasional chest discomfort which feels like a bubble sitting in his chest.  It is unrelated to physical activities, however, he has not been very physically active lately.  He has no dizziness, presyncope or syncope.  He has general fatigue, daytime somnolence and loud snoring according to his wife.    Past Medical History:   Diagnosis Date    Diabetes mellitus     Hyperlipidemia     Hypertension          Current Outpatient Medications:     atorvastatin (LIPITOR) 40 MG tablet, Take 1 tablet by mouth once daily, Disp: 90 tablet, Rfl: 0    diclofenac (VOLTAREN) 75 MG EC tablet, Take 1 tablet by mouth 2 (Two) Times a Day., Disp: 60 tablet, Rfl: 1    glucose blood (ReliOn True Metrix Test Strips) test strip, Use as instructed, Disp: 100 each, Rfl: 1    Jardiance 25 MG tablet tablet, Take 1 tablet by mouth once daily, Disp: 90 tablet, Rfl: 0    lisinopril (PRINIVIL,ZESTRIL) 20 MG tablet, Take 1 tablet by mouth once daily for 90 days, Disp: 90 tablet, Rfl: 0    metFORMIN (GLUCOPHAGE) 1000 MG tablet, Take 1 tablet by mouth 2 (Two) Times a Day With Meals for 90 days., Disp: 180 tablet, Rfl: 1    Medications Discontinued During This Encounter   Medication Reason    Diclofenac Sodium (Voltaren) 1 % gel gel *Therapy completed    cyclobenzaprine (FLEXERIL) 5 MG tablet *Therapy completed     No Known Allergies     Social History     Tobacco Use    Smoking  "status: Never    Smokeless tobacco: Never   Vaping Use    Vaping Use: Never used   Substance Use Topics    Alcohol use: Yes     Alcohol/week: 1.0 standard drink of alcohol     Types: 1 Shots of liquor per week     Comment: occaisonally     Drug use: Never       Family History   Problem Relation Age of Onset    Diabetes Father         Objective     /70   Pulse 86   Ht 182.9 cm (72.01\")   Wt 114 kg (251 lb 3.2 oz)   BMI 34.06 kg/m²       Physical Exam  Constitutional:       General: Awake. Not in acute distress.     Appearance: Normal appearance.   Neck:      Vascular: No carotid bruit, hepatojugular reflux or JVD.   Cardiovascular:      Rate and Rhythm: Normal rate and regular rhythm.      Chest Wall: PMI is not displaced.      Heart sounds: Normal heart sounds, S1 normal and S2 normal. No murmur heard.   No friction rub. No gallop. No S3 or S4 sounds.    Pulmonary:      Effort: Pulmonary effort is normal.      Breath sounds: Normal breath sounds. No wheezing, rhonchi or rales.   Ext.      Right lower leg: No edema.      Left lower leg: No edema.   Skin:     General: Skin is warm and dry.      Coloration: Skin is not cyanotic.      Findings: No petechiae or rash.   Neurological:      Mental Status: Alert and oriented x 3  Psychiatric:         Behavior: Behavior is cooperative.       Result Review :     proBNP   Date Value Ref Range Status   01/19/2024 <36.0 0.0 - 450.0 pg/mL Final     CMP          6/22/2023    09:10 1/19/2024    11:26   CMP   Glucose 112  117    BUN 17  17    Creatinine 1.01  0.99    EGFR 99.5  101.2    Sodium 134  138    Potassium 5.2  4.2    Chloride 97  101    Calcium 10.2  9.6    Total Protein 7.4  7.0    Albumin 4.9  4.4    Globulin 2.5  2.6    Total Bilirubin 0.8  0.7    Alkaline Phosphatase 61  57    AST (SGOT) 22  20    ALT (SGPT) 24  31    Albumin/Globulin Ratio 2.0  1.7    BUN/Creatinine Ratio 16.8  17.2    Anion Gap 12.2  13.6      CBC w/diff          6/22/2023    09:10 1/19/2024 " "   11:26   CBC w/Diff   WBC 7.39  6.89    RBC 5.71  5.52    Hemoglobin 16.6  16.0    Hematocrit 48.6  48.0    MCV 85.1  87.0    MCH 29.1  29.0    MCHC 34.2  33.3    RDW 12.9  12.8    Platelets 221  208    Neutrophil Rel % 69.0  58.8    Immature Granulocyte Rel % 0.4  0.1    Lymphocyte Rel % 22.6  32.5    Monocyte Rel % 6.1  6.1    Eosinophil Rel % 1.1  1.6    Basophil Rel % 0.8  0.9       Lab Results   Component Value Date    TSH 2.100 06/22/2023      Lab Results   Component Value Date    FREET4 1.4 03/30/2021      No results found for: \"DDIMERQUANT\"  No results found for: \"MG\"   No results found for: \"DIGOXIN\"   Lab Results   Component Value Date    TROPONINT 12 01/19/2024      No results found for: \"POCTROP\"(       Lipid Panel          6/22/2023    09:10   Lipid Panel   Total Cholesterol 120    Triglycerides 155    HDL Cholesterol 24    VLDL Cholesterol 27    LDL Cholesterol  69    LDL/HDL Ratio 2.71                No results found for this or any previous visit.                Diagnoses and all orders for this visit:    1. Palpitations (Primary)  -     Adult Transthoracic Echo Complete W/ Cont if Necessary Per Protocol; Future  -     Treadmill Stress Test; Future  -     Holter Monitor - 48 Hour; Future    2. Shortness of breath  -     Adult Transthoracic Echo Complete W/ Cont if Necessary Per Protocol; Future  -     Treadmill Stress Test; Future  -     Holter Monitor - 48 Hour; Future    3. Essential hypertension    4. Mixed dyslipidemia    5. Suspected sleep apnea  -     Ambulatory Referral to Sleep Medicine    6. Chest discomfort      Assessment:    Palpitations: He has been having intermittent palpitations as described in HPI.  Etiology to be determined.  His exam is benign.  ECG shows normal sinus rhythm.  Recent ER evaluation was noted and was benign.  48-hour Holter monitor be done to assess for ectopy and tachyarrhythmias.  Echo will be done for LVEF, valvular function and PA systolic pressure.  Treadmill " stress test will be done to rule out exercise-induced arrhythmias and ischemic ST-T changes.  Adequate hydration and avoidance of excessive caffeine was recommended.    Shortness of breath/chest discomfort: He has been having sporadic episodes of chest discomfort and shortness of breath.  Testing will be done as discussed above.    Essential hypertension: Stable on current regimen which will be continued.    Mixed dyslipidemia: Recent lipid profile was noted and was unremarkable.  Continue atorvastatin at the current dose.    Suspected sleep apnea: He will be referred to sleep medicine for further evaluation.    Follow Up       Return for Return to clinic after diagnostic testing, With Moira BALLARD.    Patient was given instructions and counseling regarding his condition or for health maintenance advice. Please see specific information pulled into the AVS if appropriate.

## 2024-02-28 NOTE — PROGRESS NOTES
**A [employer] message has been sent fot PATIENT ROUNDING with [facility] .

## 2024-03-04 ENCOUNTER — OFFICE VISIT (OUTPATIENT)
Dept: SLEEP MEDICINE | Facility: HOSPITAL | Age: 39
End: 2024-03-04
Payer: MEDICAID

## 2024-03-04 VITALS
OXYGEN SATURATION: 97 % | WEIGHT: 246.7 LBS | HEIGHT: 72 IN | SYSTOLIC BLOOD PRESSURE: 133 MMHG | HEART RATE: 76 BPM | BODY MASS INDEX: 33.41 KG/M2 | DIASTOLIC BLOOD PRESSURE: 78 MMHG

## 2024-03-04 DIAGNOSIS — R29.818 SUSPECTED SLEEP APNEA: Primary | ICD-10-CM

## 2024-03-04 DIAGNOSIS — R06.83 SNORING: ICD-10-CM

## 2024-03-04 DIAGNOSIS — Z72.821 INADEQUATE SLEEP HYGIENE: ICD-10-CM

## 2024-03-04 DIAGNOSIS — H50.9 STRABISMUS: ICD-10-CM

## 2024-03-04 DIAGNOSIS — I10 ESSENTIAL HYPERTENSION: ICD-10-CM

## 2024-03-04 DIAGNOSIS — E66.9 CLASS 1 OBESITY WITH SERIOUS COMORBIDITY AND BODY MASS INDEX (BMI) OF 33.0 TO 33.9 IN ADULT, UNSPECIFIED OBESITY TYPE: ICD-10-CM

## 2024-03-04 DIAGNOSIS — R06.81 WITNESSED EPISODE OF APNEA: ICD-10-CM

## 2024-03-04 DIAGNOSIS — G47.19 EXCESSIVE DAYTIME SLEEPINESS: ICD-10-CM

## 2024-03-04 PROCEDURE — 3078F DIAST BP <80 MM HG: CPT | Performed by: FAMILY MEDICINE

## 2024-03-04 PROCEDURE — 99204 OFFICE O/P NEW MOD 45 MIN: CPT | Performed by: FAMILY MEDICINE

## 2024-03-04 PROCEDURE — 1159F MED LIST DOCD IN RCRD: CPT | Performed by: FAMILY MEDICINE

## 2024-03-04 PROCEDURE — G0463 HOSPITAL OUTPT CLINIC VISIT: HCPCS

## 2024-03-04 PROCEDURE — 3075F SYST BP GE 130 - 139MM HG: CPT | Performed by: FAMILY MEDICINE

## 2024-03-04 PROCEDURE — 1160F RVW MEDS BY RX/DR IN RCRD: CPT | Performed by: FAMILY MEDICINE

## 2024-03-04 NOTE — PROGRESS NOTES
[facility] [place] [address]  [place]   [place] [address]  Phone: [phone]  Fax: [phone]      Matt Albarran  [id number]   1985  38 y.o.  male      Referring physician/provider Dr. [facility]  PCP Tierra Askew APRN    Type of service: Initial Sleep Medicine Consult.  Date of service: 3/4/2024      Chief Complaint   Patient presents with    Snoring    Witnessed Apnea       History of present illness;  The patient was seen today on 3/4/2024 at [facility].    Thank you for asking to see Matt Venegasquez, 38 y.o. PMHx HTN, NIDDM, Strabismus (left eye - chronic), Obesity.  The patient presents for initial evaluation of sleep sleep disordered breathing.  Patient  denies prior surgery namely tonsillectomy, nasal surgery or UPPP.     Snoring noted by his wife   Patient never had a sleep study in the past   Obstructive Sleep Apnea Screening: STOP-BANG Sleep Apnea Questionnaire. Reference: Papo F et al. Br J Anaesth, 2012.     Criterion    Yes    No  Do you SNORE loudly?   [x]   Yes  []   No   Do you often feel TIRED, fatigued, or sleepy during the day?    [x]   Yes  []   No  Has anyone OBSERVED you stop breathing during your sleep?    [x]   Yes  []   No  Do you have or are you being treated for high blood PRESSURE?    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; weekends 3 AM (night shift worker)  Rise Time: Weekdays 4:30 PM; weekends no set time  Sleep aids: 5 mg melatonin  "gummy   Sleep Latency: 15 to 20 minutes to fall asleep after melatonin   Screens in bed: Always documentary on DVD player   Wake after sleep onset: Twice  Reasons for awakenings: Use restroom to urinate  Number of naps per day up to 1/day  Naps restorative: Denies  Caffeine use: 5 more beverages per day    RLS Symptoms: No   Bruxism:No   Current sleep related gastroesophageal reflux symptoms:  No   Cataplexy:  No   Sleep Paralysis:  No   Hypnagogic or hypnopompic hallucinations: No   Parasomnias such as sleep walking or sleep eating No     Disclaimer Sleep History: The above sleep history is based on this sleep physician's in room encounter with the patient. Pre encounter self administered questionnaires are taken into consideration and discussed with patient for any discordance. The above documentation by this sleep physician is the most accurate clinical information determined by in room sleep physician encounter with patient.     MEDICAL CONDITIONS (PMH)   HTN  NIDDM  Obesity    Social history:  Do you drive a commercial vehicle:  No   Shift work:  No   Tobacco use:  No   Alcohol use: 1 per week  Occupation: Works in [employer]     Family Hx (parents and siblings) (pertaining to sleep medicine)  Patient unable to provide    Medications: reviewed    Review of systems is negative unless otherwise noted per HPI   Disclaimer History: The above history is based on this sleep physician's in room encounter with the patient. Pre encounter self administered questionnaires are taken into consideration and discussed with patient for any discordance. The above documentation by this sleep physician is the most accurate clinical information determined by in room sleep physician encounter with patient.     Physical exam:  Vitals:    03/04/24 0700   BP: 133/78   Pulse: 76   SpO2: 97%   Weight: 112 kg (246 lb 11.2 oz)   Height: 182.9 cm (72.01\")    Body mass index is 33.45 kg/m².   CONSTITUTIONAL:  Non-toxic, In no overt distress "   Head: normocephalic   ENT: Mallampati class IV, + macroglossia, no septal defects   NECK:Neck Circumference: 16.5 inches,no nuchal rigidity  RESPIRATORY SYSTEM: Breath sounds are clear (no rales, no rhonchi, no wheezes), no accessory muscle use  CARDIOVASULAR SYSTEM: Heart sounds are regular rhythm and normal rate, no rub, no gallop, no edema  NEUROLOGICAL SYSTEM: CN II through XII are grossly intact save left eye lateral strabismus endorses as lifelong, no gross focal deficits  PSYCHIATRIC SYSTEM: Goal oriented, affect full range appropriate      Office notes from care team reviewed:      -2/21/2024 Office [facility] Dr. [facility] suspect sleep apnea      Labs reviewed.  TSH          6/22/2023    09:10   TSH   TSH 2.100       Most Recent A1C          6/22/2023    09:10   HGBA1C Most Recent   Hemoglobin A1C 6.40     -1/19/24  Bicarb 23.4      Assessment and plan:  Suspected sleep apnea [R29.818] patient's symptoms and examination is consistent with sleep apnea (G47.30). I have talked to the patient about the signs and symptoms of sleep apnea. In addition, I have also discussed pathophysiology of sleep apnea.  I also discussed the complications of untreated sleep apnea including effects on hypertension, diabetes mellitus and nonrestorative sleep with hypersomnia which can increase risk for motor vehicle accidents.  Untreated sleep apnea is also a risk factor for development of atrial fibrillation, hypertension, insulin resistance and cerebrovascular accident.  Discussed in detail of various testing methods including home-based and lab based sleep studies.  Based on history and physical examination and other comorbidities the most appropriate study is Home Sleep Study.  The order for the sleep study is placed in [facility].  The test will be scheduled after approval from insurance. Treatment and management will be discussed after the test is completed.  High pretest probability STOP-BANG 7/8, macroglossia,  Mallampati is IV.  Home sleep study may rule in sleep apnea.  However, under patient's specific clinical circumstances home sleep study may not rule out sleep apnea.  If home sleep testing is negative must proceed with in laboratory polysomnography to definitively rule out sleep apnea (the prior was discussed with patient). Patient was given opportunity to ask questions and all the questions were answered.   -Patient verbalizes comfort and normalizing his sleep schedule for nighttime sleep should he require an in lab study for future without return to office visit to discuss  Snoring (R06.83), snoring is the sound created by turbulent airflow vibrating upper airway soft tissue due to limitation of inspiratory airflow. I have also discussed factors affecting snoring including sleep deprivation, sleeping on the back and alcohol ingestion. To minimize snoring, patient is advised to have adequate sleep, sleep on the side and avoid alcohol and sedative medications before bedtime  Excessive daytime sleepiness .  Patient endorses subjective excessive daytime sleepiness with sleep physician encounter which was inconsistent with patient's pre-encounter self-administered [place] Sleepiness Scale of Total score: 7.  There are many causes for daytime excessive sleepiness including sleep depression, shiftwork syndrome, depression and other medical disorders including heart, kidney and liver failure.  This is sleep disordered breathing until proven otherwise. The most common cause of excessive sleepiness is due to sleep apnea with frequent awakenings during sleep time.  I have discussed safety of driving and to remain vigilant while driving; patient verbalized understanding of counseling.  Inadequate sleep hygiene [Z72.821] Counseled lifestyle modifications as below to be applied especially night of any sleep study, verbalized understanding of same. Follow up with PCP to reinforce my counseling.  Obesity, patient's BMI is Body  mass index is 33.45 kg/m².. I have discussed the relationship between weight and sleep apnea.There is direct correlation between weight and severity of sleep apnea.  Weight reduction is encouraged, as it is going to reduce the severity of sleep apnea. I have also discussed with the patient diet and exercise to achieve ideal body weight.  HTN,  Follow up with primary care physician for continued management. This medical condition would make the patient eligible for a trial of PAP therapy even if sleep study reveals mild severity sleep apnea.  Strabismus (left eye), Endorsed as chronic. Follow up with PCP.       I have also discussed with the patient the following  Sleep hygiene: Maintaining a regular bedtime and wake time, not to watch television or work in bed, limit caffeine-containing beverages before bed time and avoid naps during the day  Adequate amount of sleep.  Generally most people needs about 7 to 8 hours of sleep.      Return for 31 to 90 days after PAP setup with down load.  Patient's questions were answered      I once again thank you for asking me to see this patient in consultation and I have forwarded my opinion and treatment plan.  Please do not hesitate to call me if you have any questions.       EMR Dragon/Transcription disclaimer:   Much of this encounter note is an electronic transcription/translation of spoken language to printed text. The electronic translation of spoken language may permit erroneous, or at times, nonsensical words or phrases to be inadvertently transcribed; Although I have reviewed the note for such errors, some may still exist.     NPI #: [id number]    Phillip Dubon, DO  Sleep Medicine  [facility]  03/04/24

## 2024-03-15 ENCOUNTER — TELEPHONE (OUTPATIENT)
Dept: INTERNAL MEDICINE | Facility: CLINIC | Age: 39
End: 2024-03-15
Payer: MEDICAID

## 2024-03-15 NOTE — TELEPHONE ENCOUNTER
Caller: Matt Albarran    Relationship: Self    Best call back number: [phone]     Requested Prescriptions:   Requested Prescriptions     Pending Prescriptions Disp Refills    metFORMIN (GLUCOPHAGE) 1000 MG tablet 180 tablet 1     Sig: Take 1 tablet by mouth 2 (Two) Times a Day With Meals for 90 days.        Pharmacy where request should be sent: [address] [phone] [facility] [phone] [facility]     Last office visit with prescribing clinician: 1/10/2023   Last telemedicine visit with prescribing clinician: Visit date not found   Next office visit with prescribing clinician: Visit date not found     Does the patient have less than a 3 day supply:  [x] Yes  [] No    Would you like a call back once the refill request has been completed: [] Yes [x] No    If the office needs to give you a call back, can they leave a voicemail: [] Yes [x] No    Hailey Rollins, PCT   03/15/24 08:16 EDT

## 2024-03-18 ENCOUNTER — OFFICE VISIT (OUTPATIENT)
Dept: INTERNAL MEDICINE | Facility: CLINIC | Age: 39
End: 2024-03-18
Payer: MEDICAID

## 2024-03-18 VITALS
TEMPERATURE: 96.5 F | SYSTOLIC BLOOD PRESSURE: 132 MMHG | DIASTOLIC BLOOD PRESSURE: 84 MMHG | WEIGHT: 245.8 LBS | BODY MASS INDEX: 33.29 KG/M2 | HEIGHT: 72 IN | OXYGEN SATURATION: 98 % | HEART RATE: 74 BPM

## 2024-03-18 DIAGNOSIS — Z00.00 ANNUAL PHYSICAL EXAM: Primary | ICD-10-CM

## 2024-03-18 DIAGNOSIS — E78.5 HYPERLIPIDEMIA, UNSPECIFIED HYPERLIPIDEMIA TYPE: ICD-10-CM

## 2024-03-18 DIAGNOSIS — Z13.9 SCREENING FOR CONDITION: ICD-10-CM

## 2024-03-18 DIAGNOSIS — E11.65 TYPE 2 DIABETES MELLITUS WITH HYPERGLYCEMIA, WITHOUT LONG-TERM CURRENT USE OF INSULIN: ICD-10-CM

## 2024-03-18 DIAGNOSIS — R53.83 FATIGUE, UNSPECIFIED TYPE: ICD-10-CM

## 2024-03-18 DIAGNOSIS — R00.2 PALPITATIONS: ICD-10-CM

## 2024-03-18 DIAGNOSIS — I10 ESSENTIAL HYPERTENSION: ICD-10-CM

## 2024-03-18 LAB
ALBUMIN SERPL-MCNC: 5 G/DL (ref 3.5–5.2)
ALBUMIN UR-MCNC: <1.2 MG/DL
ALBUMIN/GLOB SERPL: 2.1 G/DL
ALP SERPL-CCNC: 68 U/L (ref 39–117)
ALT SERPL W P-5'-P-CCNC: 30 U/L (ref 1–41)
ANION GAP SERPL CALCULATED.3IONS-SCNC: 9 MMOL/L (ref 5–15)
AST SERPL-CCNC: 20 U/L (ref 1–40)
BASOPHILS # BLD AUTO: 0.07 10*3/MM3 (ref 0–0.2)
BASOPHILS NFR BLD AUTO: 1 % (ref 0–1.5)
BILIRUB SERPL-MCNC: 0.7 MG/DL (ref 0–1.2)
BUN SERPL-MCNC: 15 MG/DL (ref 6–20)
BUN/CREAT SERPL: 13.4 (ref 7–25)
CALCIUM SPEC-SCNC: 10.3 MG/DL (ref 8.6–10.5)
CHLORIDE SERPL-SCNC: 100 MMOL/L (ref 98–107)
CHOLEST SERPL-MCNC: 160 MG/DL (ref 0–200)
CO2 SERPL-SCNC: 27 MMOL/L (ref 22–29)
CREAT SERPL-MCNC: 1.12 MG/DL (ref 0.76–1.27)
DEPRECATED RDW RBC AUTO: 40.6 FL (ref 37–54)
EGFRCR SERPLBLD CKD-EPI 2021: 87.3 ML/MIN/1.73
EOSINOPHIL # BLD AUTO: 0.08 10*3/MM3 (ref 0–0.4)
EOSINOPHIL NFR BLD AUTO: 1.2 % (ref 0.3–6.2)
ERYTHROCYTE [DISTWIDTH] IN BLOOD BY AUTOMATED COUNT: 13 % (ref 12.3–15.4)
FERRITIN SERPL-MCNC: 171 NG/ML (ref 30–400)
FOLATE SERPL-MCNC: 17.7 NG/ML (ref 4.78–24.2)
GLOBULIN UR ELPH-MCNC: 2.4 GM/DL
GLUCOSE SERPL-MCNC: 121 MG/DL (ref 65–99)
HBA1C MFR BLD: 6.7 % (ref 4.8–5.6)
HCT VFR BLD AUTO: 48.5 % (ref 37.5–51)
HCV AB SER DONR QL: NORMAL
HDLC SERPL-MCNC: 28 MG/DL (ref 40–60)
HGB BLD-MCNC: 16.5 G/DL (ref 13–17.7)
IMM GRANULOCYTES # BLD AUTO: 0.03 10*3/MM3 (ref 0–0.05)
IMM GRANULOCYTES NFR BLD AUTO: 0.4 % (ref 0–0.5)
IRON 24H UR-MRATE: 93 MCG/DL (ref 59–158)
IRON SATN MFR SERPL: 22 % (ref 20–50)
LDLC SERPL CALC-MCNC: 107 MG/DL (ref 0–100)
LDLC/HDLC SERPL: 3.74 {RATIO}
LYMPHOCYTES # BLD AUTO: 2.12 10*3/MM3 (ref 0.7–3.1)
LYMPHOCYTES NFR BLD AUTO: 30.8 % (ref 19.6–45.3)
MCH RBC QN AUTO: 29.5 PG (ref 26.6–33)
MCHC RBC AUTO-ENTMCNC: 34 G/DL (ref 31.5–35.7)
MCV RBC AUTO: 86.8 FL (ref 79–97)
MONOCYTES # BLD AUTO: 0.41 10*3/MM3 (ref 0.1–0.9)
MONOCYTES NFR BLD AUTO: 6 % (ref 5–12)
NEUTROPHILS NFR BLD AUTO: 4.18 10*3/MM3 (ref 1.7–7)
NEUTROPHILS NFR BLD AUTO: 60.6 % (ref 42.7–76)
NRBC BLD AUTO-RTO: 0 /100 WBC (ref 0–0.2)
PLATELET # BLD AUTO: 234 10*3/MM3 (ref 140–450)
PMV BLD AUTO: 11.4 FL (ref 6–12)
POTASSIUM SERPL-SCNC: 4.8 MMOL/L (ref 3.5–5.2)
PROT SERPL-MCNC: 7.4 G/DL (ref 6–8.5)
RBC # BLD AUTO: 5.59 10*6/MM3 (ref 4.14–5.8)
SODIUM SERPL-SCNC: 136 MMOL/L (ref 136–145)
T4 FREE SERPL-MCNC: 1.37 NG/DL (ref 0.93–1.7)
TIBC SERPL-MCNC: 419 MCG/DL (ref 298–536)
TRANSFERRIN SERPL-MCNC: 281 MG/DL (ref 200–360)
TRIGL SERPL-MCNC: 137 MG/DL (ref 0–150)
TSH SERPL DL<=0.05 MIU/L-ACNC: 5.08 UIU/ML (ref 0.27–4.2)
VIT B12 BLD-MCNC: 703 PG/ML (ref 211–946)
VLDLC SERPL-MCNC: 25 MG/DL (ref 5–40)
WBC NRBC COR # BLD AUTO: 6.89 10*3/MM3 (ref 3.4–10.8)

## 2024-03-18 PROCEDURE — 83540 ASSAY OF IRON: CPT | Performed by: NURSE PRACTITIONER

## 2024-03-18 PROCEDURE — 84439 ASSAY OF FREE THYROXINE: CPT | Performed by: NURSE PRACTITIONER

## 2024-03-18 PROCEDURE — 84466 ASSAY OF TRANSFERRIN: CPT | Performed by: NURSE PRACTITIONER

## 2024-03-18 PROCEDURE — 83036 HEMOGLOBIN GLYCOSYLATED A1C: CPT | Performed by: NURSE PRACTITIONER

## 2024-03-18 PROCEDURE — 82043 UR ALBUMIN QUANTITATIVE: CPT | Performed by: NURSE PRACTITIONER

## 2024-03-18 PROCEDURE — 82728 ASSAY OF FERRITIN: CPT | Performed by: NURSE PRACTITIONER

## 2024-03-18 PROCEDURE — 80050 GENERAL HEALTH PANEL: CPT | Performed by: NURSE PRACTITIONER

## 2024-03-18 PROCEDURE — 80061 LIPID PANEL: CPT | Performed by: NURSE PRACTITIONER

## 2024-03-18 PROCEDURE — 84403 ASSAY OF TOTAL TESTOSTERONE: CPT | Performed by: NURSE PRACTITIONER

## 2024-03-18 PROCEDURE — 82746 ASSAY OF FOLIC ACID SERUM: CPT | Performed by: NURSE PRACTITIONER

## 2024-03-18 PROCEDURE — 82607 VITAMIN B-12: CPT | Performed by: NURSE PRACTITIONER

## 2024-03-18 PROCEDURE — 84402 ASSAY OF FREE TESTOSTERONE: CPT | Performed by: NURSE PRACTITIONER

## 2024-03-18 PROCEDURE — 86803 HEPATITIS C AB TEST: CPT | Performed by: NURSE PRACTITIONER

## 2024-03-18 NOTE — ASSESSMENT & PLAN NOTE
Well controlled, continue metformin and Jardiance. Most recent HgbA1c 6.4. Patient should monitor blood glucose levels closely and call or return to clinic with consistent elevations or frequent lows. Repeat labs in clinic today. Diabetic eye exam: Patient to schedule. Diabetic foot exam: Today. Urine microalbumin: Today.  Renal protection: ACE. Pneumonia vaccination: Agreeable today.

## 2024-03-19 DIAGNOSIS — R79.89 ELEVATED TSH: Primary | ICD-10-CM

## 2024-03-20 ENCOUNTER — TELEPHONE (OUTPATIENT)
Dept: INTERNAL MEDICINE | Facility: CLINIC | Age: 39
End: 2024-03-20
Payer: MEDICAID

## 2024-03-20 LAB
TESTOST FREE SERPL-MCNC: 6.5 PG/ML (ref 8.7–25.1)
TESTOST SERPL-MCNC: 202 NG/DL (ref 264–916)

## 2024-03-20 NOTE — TELEPHONE ENCOUNTER
Hub staff attempted to follow warm transfer process and was unsuccessful     Caller: Matt Albarran    Relationship to patient: Self    Best call back number: [phone]    Patient is needing: PATIENT IS RETURNING A CALL TO DISCUSS LAB RESULTS.

## 2024-03-22 DIAGNOSIS — R79.89 LOW TESTOSTERONE IN MALE: Primary | ICD-10-CM

## 2024-03-25 ENCOUNTER — CLINICAL SUPPORT (OUTPATIENT)
Dept: INTERNAL MEDICINE | Facility: CLINIC | Age: 39
End: 2024-03-25
Payer: MEDICAID

## 2024-03-25 DIAGNOSIS — R79.89 ELEVATED TSH: Primary | ICD-10-CM

## 2024-03-25 LAB
T4 FREE SERPL-MCNC: 1.56 NG/DL (ref 0.93–1.7)
TSH SERPL DL<=0.05 MIU/L-ACNC: 4.52 UIU/ML (ref 0.27–4.2)

## 2024-03-25 PROCEDURE — 36415 COLL VENOUS BLD VENIPUNCTURE: CPT | Performed by: NURSE PRACTITIONER

## 2024-03-25 PROCEDURE — 84402 ASSAY OF FREE TESTOSTERONE: CPT | Performed by: NURSE PRACTITIONER

## 2024-03-25 PROCEDURE — 84403 ASSAY OF TOTAL TESTOSTERONE: CPT | Performed by: NURSE PRACTITIONER

## 2024-03-25 PROCEDURE — 84439 ASSAY OF FREE THYROXINE: CPT | Performed by: NURSE PRACTITIONER

## 2024-03-25 PROCEDURE — 84443 ASSAY THYROID STIM HORMONE: CPT | Performed by: NURSE PRACTITIONER

## 2024-03-26 LAB
TESTOST FREE SERPL-MCNC: 2.3 PG/ML (ref 8.7–25.1)
TESTOST SERPL-MCNC: 173 NG/DL (ref 264–916)

## 2024-04-02 DIAGNOSIS — R79.89 LOW TESTOSTERONE IN MALE: Primary | ICD-10-CM

## 2024-04-09 ENCOUNTER — HOSPITAL ENCOUNTER (OUTPATIENT)
Dept: CARDIOLOGY | Facility: HOSPITAL | Age: 39
Discharge: HOME | End: 2024-04-09
Payer: MEDICAID

## 2024-04-09 DIAGNOSIS — R06.02 SHORTNESS OF BREATH: ICD-10-CM

## 2024-04-09 DIAGNOSIS — R00.2 PALPITATIONS: ICD-10-CM

## 2024-04-09 LAB
BH CV ECHO MEAS - AO MAX PG: 4 MMHG
BH CV ECHO MEAS - AO MEAN PG: 2.8 MMHG
BH CV ECHO MEAS - AO V2 MAX: 100.1 CM/SEC
BH CV ECHO MEAS - AO V2 VTI: 24.4 CM
BH CV ECHO MEAS - AVA(I,D): 3.1 CM2
BH CV ECHO MEAS - EDV(CUBED): 115.6 ML
BH CV ECHO MEAS - EDV(MOD-SP2): 112 ML
BH CV ECHO MEAS - EDV(MOD-SP4): 125 ML
BH CV ECHO MEAS - EF(MOD-BP): 58.7 %
BH CV ECHO MEAS - EF(MOD-SP2): 59.5 %
BH CV ECHO MEAS - EF(MOD-SP4): 61.4 %
BH CV ECHO MEAS - ESV(CUBED): 25.4 ML
BH CV ECHO MEAS - ESV(MOD-SP2): 45.4 ML
BH CV ECHO MEAS - ESV(MOD-SP4): 48.2 ML
BH CV ECHO MEAS - FS: 39.7 %
BH CV ECHO MEAS - IVS/LVPW: 1.06 CM
BH CV ECHO MEAS - IVSD: 0.88 CM
BH CV ECHO MEAS - LA DIMENSION: 3.4 CM
BH CV ECHO MEAS - LAT PEAK E' VEL: 17.4 CM/SEC
BH CV ECHO MEAS - LV DIASTOLIC VOL/BSA (35-75): 53.8 CM2
BH CV ECHO MEAS - LV MASS(C)D: 142.4 GRAMS
BH CV ECHO MEAS - LV MAX PG: 3.1 MMHG
BH CV ECHO MEAS - LV MEAN PG: 1.92 MMHG
BH CV ECHO MEAS - LV SYSTOLIC VOL/BSA (12-30): 20.8 CM2
BH CV ECHO MEAS - LV V1 MAX: 87.4 CM/SEC
BH CV ECHO MEAS - LV V1 VTI: 22.7 CM
BH CV ECHO MEAS - LVIDD: 4.9 CM
BH CV ECHO MEAS - LVIDS: 2.9 CM
BH CV ECHO MEAS - LVOT AREA: 3.3 CM2
BH CV ECHO MEAS - LVOT DIAM: 2.06 CM
BH CV ECHO MEAS - LVPWD: 0.83 CM
BH CV ECHO MEAS - MED PEAK E' VEL: 12.4 CM/SEC
BH CV ECHO MEAS - MV A MAX VEL: 54.4 CM/SEC
BH CV ECHO MEAS - MV DEC SLOPE: 666.1 CM/SEC2
BH CV ECHO MEAS - MV DEC TIME: 0.15 SEC
BH CV ECHO MEAS - MV E MAX VEL: 100 CM/SEC
BH CV ECHO MEAS - MV E/A: 1.84
BH CV ECHO MEAS - PA V2 MAX: 85.5 CM/SEC
BH CV ECHO MEAS - RVDD: 3.9 CM
BH CV ECHO MEAS - SI(MOD-SP2): 28.7 ML/M2
BH CV ECHO MEAS - SI(MOD-SP4): 33.1 ML/M2
BH CV ECHO MEAS - SV(LVOT): 75.9 ML
BH CV ECHO MEAS - SV(MOD-SP2): 66.6 ML
BH CV ECHO MEAS - SV(MOD-SP4): 76.8 ML
BH CV ECHO MEAS - TAPSE (>1.6): 2.13 CM
BH CV ECHO MEASUREMENTS AVERAGE E/E' RATIO: 6.71
BH CV IMMEDIATE POST RECOVERY TECH DATA SYMPTOMS: NORMAL
BH CV IMMEDIATE POST TECH DATA BLOOD PRESSURE: NORMAL MMHG
BH CV IMMEDIATE POST TECH DATA HEART RATE: 164 BPM
BH CV IMMEDIATE POST TECH DATA OXYGEN SATS: 96 %
BH CV NINE MINUTE RECOVERY TECH DATA BLOOD PRESSURE: NORMAL MMHG
BH CV NINE MINUTE RECOVERY TECH DATA HEART RATE: 88 BPM
BH CV NINE MINUTE RECOVERY TECH DATA OXYGEN SATURATION: 96 %
BH CV NINE MINUTE RECOVERY TECH DATA SYMPTOMS: NORMAL
BH CV SIX MINUTE RECOVERY TECH DATA BLOOD PRESSURE: NORMAL
BH CV SIX MINUTE RECOVERY TECH DATA HEART RATE: 98 BPM
BH CV SIX MINUTE RECOVERY TECH DATA OXYGEN SATURATION: 96 %
BH CV SIX MINUTE RECOVERY TECH DATA SYMPTOMS: NORMAL
BH CV STRESS BP STAGE 1: NORMAL
BH CV STRESS BP STAGE 2: NORMAL
BH CV STRESS BP STAGE 3: NORMAL
BH CV STRESS BP STAGE 4: NORMAL
BH CV STRESS DURATION MIN STAGE 1: 3
BH CV STRESS DURATION MIN STAGE 2: 3
BH CV STRESS DURATION MIN STAGE 3: 3
BH CV STRESS DURATION MIN STAGE 4: 3
BH CV STRESS DURATION SEC STAGE 1: 0
BH CV STRESS DURATION SEC STAGE 2: 0
BH CV STRESS DURATION SEC STAGE 3: 0
BH CV STRESS DURATION SEC STAGE 4: 0
BH CV STRESS GRADE STAGE 1: 10
BH CV STRESS GRADE STAGE 2: 12
BH CV STRESS GRADE STAGE 3: 14
BH CV STRESS GRADE STAGE 4: 16
BH CV STRESS HR STAGE 1: 102
BH CV STRESS HR STAGE 2: 122
BH CV STRESS HR STAGE 3: 141
BH CV STRESS HR STAGE 4: 164
BH CV STRESS METS STAGE 1: 5
BH CV STRESS METS STAGE 2: 7.5
BH CV STRESS METS STAGE 3: 10
BH CV STRESS METS STAGE 4: 13.5
BH CV STRESS PROTOCOL 1: NORMAL
BH CV STRESS RECOVERY BP: NORMAL MMHG
BH CV STRESS RECOVERY HR: 88 BPM
BH CV STRESS RECOVERY O2: 96 %
BH CV STRESS SPEED STAGE 1: 1.7
BH CV STRESS SPEED STAGE 2: 2.5
BH CV STRESS SPEED STAGE 3: 3.4
BH CV STRESS SPEED STAGE 4: 4.2
BH CV STRESS STAGE 1: 1
BH CV STRESS STAGE 2: 2
BH CV STRESS STAGE 3: 3
BH CV STRESS STAGE 4: 4
BH CV THREE MINUTE POST TECH DATA BLOOD PRESSURE: NORMAL MMHG
BH CV THREE MINUTE POST TECH DATA HEART RATE: 102 BPM
BH CV THREE MINUTE POST TECH DATA OXYGEN SATURATION: 97 %
BH CV THREE MINUTE RECOVERY TECH DATA SYMPTOM: NORMAL
IVRT: 71 MS
LEFT ATRIUM VOLUME INDEX: 23.6 ML/M2
MAXIMAL PREDICTED HEART RATE: 184 BPM
PERCENT MAX PREDICTED HR: 89.13 %
STRESS BASELINE BP: NORMAL MMHG
STRESS BASELINE HR: 65 BPM
STRESS O2 SAT REST: 96 %
STRESS PERCENT HR: 105 %
STRESS POST ESTIMATED WORKLOAD: 12.6 METS
STRESS POST EXERCISE DUR MIN: 10 MIN
STRESS POST EXERCISE DUR SEC: 30 SEC
STRESS POST O2 SAT PEAK: 97 %
STRESS POST PEAK BP: NORMAL MMHG
STRESS POST PEAK HR: 164 BPM
STRESS TARGET HR: 156 BPM

## 2024-04-09 PROCEDURE — 93306 TTE W/DOPPLER COMPLETE: CPT | Performed by: INTERNAL MEDICINE

## 2024-04-09 PROCEDURE — 93016 CV STRESS TEST SUPVJ ONLY: CPT | Performed by: NURSE PRACTITIONER

## 2024-04-09 PROCEDURE — 93306 TTE W/DOPPLER COMPLETE: CPT

## 2024-04-09 PROCEDURE — 93017 CV STRESS TEST TRACING ONLY: CPT

## 2024-04-09 PROCEDURE — 93018 CV STRESS TEST I&R ONLY: CPT | Performed by: INTERNAL MEDICINE

## 2024-04-09 NOTE — PROGRESS NOTES
[facility] OUTPT RADIATION ONCOLOGY  [address] [facility] [address]  Dept Phone: [phone]    Radiation Oncology Consult    Patient Name:  Xavi Vieyra  YOB: 1934  MRN:  [id number]  Account Number:  [id number]  Referring Physician:  Franck Richardson MD  Dictating Physician:  Yessica Ramirez MD  PCP:  Malvin Price MD   Med Onc Physician:  Adarsh Neal  Surg Onc Physician:  Dino Richardson    Diagnosis:  Cancer Staging  No matching staging information was found for the patient.    Reason for Consultation:  Patient seen for consideration of palliative radiation therapy.     HISTORY     History of Present Illness:  Xavi Vieyra is a 87 year old male with PMH of peripheral vascular disease, bilateral carotid stenosis, CABG x3 vessesl.    Patient is an 87-year-old gentleman who had a left neck mass that was incidentally identified but not worked up as he was being treated for other medical issues.      Last year, he underwent CABG with three-vessel bypass on July 22, 2021.  Part of his screening he had a CTA of the head and neck region for carotid artery stenosis.  This noted stenosis of 92% on the right internal carotid which was narrowest at 1.3 mm.  The left carotid was stenosed at 80%.  There was an incidental 2.1 x 2.9 cm mass with numerous punctate calcifications along the jugular chain anterior to the anterior margin of the sternocleidomastoid muscle posterior to the superior aspect of the left submandibular gland.        He underwent stenting of the right carotid artery prior to the CABG.  He was on anticoagulation.     The mass was otherwise ignored by the patient until about May 7, 2022 this year when he could not close his shirt collar together when he was trying to get dressed up for a family event.  It was significantly larger at this time.  His daughter Cathryn works at [employer] ICU and had the patient seen by ENT.    He was seen on  Notify patient the results of his  ultrasound of his  heart was normal.  The heart is functioning well and there were no structural or valvular abnormalities.  3/9/2022 by Dr. Dino Osuna who noted a large greater than 5 cm mass of the left lesion.  The rest of the head and neck exam including a fiberoptic flexible laryngoscopy was normal.    MRI done Mary 10, 2022 of the neck shows a T2 hyperintense, T1 hypointense heterogeneously enhancing left level 2A corwin conglomeration measuring 5.07 x 3.2 x 7.2 cm extension of edema and enhancement of the left side muscle.  The previously measured 2.2 x 2.7.    June 15th FNA of the left neck was positive for malignancy, squamous cell carcinoma.  It was negative for lymphoma.  HPV negative.    He was still in the middle of work-up when he suffered a stroke.  He was sitting in the chair and he felt like the room was spinning.    On June 21 2022 the patient had some lightheadedness and was found to have a nonhemorrhagic interval small cortical ischemic infarct within the posterior lateral left cerebellar hemisphere.    MRI of the brain on June 22, 2022 showed nonhemorrhagic cerebellar infarcts.  June 22, 2022 MRA of the neck showed stenting of the proximal right internal carotid artery and atherosclerosis of the left internal carotid artery measuring about 60-70%.      Patient underwent IR carotid stenting of the left side on 3/24/2020.  8 x 30% stent was placed across the space.    On July 8, 2022 the patient underwent a PET CT scan.  There is some moderate asymmetric focal activity in the left palatine tonsil but suspicious for malignancy, measuring 1.11 x 0.76 cm with SUV of 5.96.  He had a large lymph node in the left submandibular region level 2.  The lateral aspect of it showed punctate calcifications.  Medial aspect showed intense activity.  It  was 2.44 x 1.3 cm size with the medial aspect of 1.26 x 1.27 cm with SUV of 9.77.  There was no other abnormal appearance of the head neck.    There was an osteolytic abnormality of the sclerotic rim of the L4 vertebral body involving the inferior endplate in the posterior aspect right  side with SUV of 7.05 size of 1.6 x 1.08 cm.  Also a focal avid osteolytic abnormality in the left 10th rib posteriorly that was 1.3 x 1.1 cm with SUV of 9.9.  There is mild activity in the left iliac bone at the SI joint level lateral cortex.  1.14 x 0.74 cm with SUV of 2.44.  These findings are suspicious for skeletal metastatic disease.  There were no lung nodules.  There are some mild lymph nodes in the right and left hilar lesion that were thought to be incidental benign.                       Patient is here with his daughter.  He is not in any major pain but he does note that the mass in the left neck has gotten bigger.  There is no skin ulceration.  He has full range of motion.  He is able to eat and swallow everything.  He is not on any pain medications at this time.    He was born in [place] and used to work as a  including the [employer] in [place].  He is planning to go to [place] for 3 weeks on August 3, 2022.     and lives alone.  He has 5 children.  His daughter has 4 boys who help with rides.    Quit smoking 60 years ago and quit drinking 40 years ago.    Past Medical History:   Diagnosis Date   • Carotid stenosis    • Coronary artery disease    • Eczema    • Essential (primary) hypertension    • High cholesterol    • Hyperlipoproteinemia        Past Surgical History:   Procedure Laterality Date   • Appendectomy     • Carotid stent Right 07/07/2021   • Cataract extraction, bilateral     • Coronary artery bypass graft  07/22/2021    3 vessel Dr. Fuller   • Eye surgery     • Hernia repair          Current Outpatient Medications   Medication Sig Dispense Refill   • docusate sodium-sennosides (SENOKOT S) 50-8.6 MG per tablet Take 1 tablet by mouth nightly.     • aspirin 325 MG tablet Take 1 tablet by mouth daily for 4 days. 4 tablet 0   • aspirin 81 MG chewable tablet Chew 1 tablet by mouth daily. Do not start before July 2, 2022. 90 tablet 0   • apixaBAN (ELIQUIS) 5 MG  Tab Take 1 tablet by mouth every 12 hours. Do not start before July 2, 2022. 180 tablet 3   • clopidogrel (PLAVIX) 75 MG tablet Take 1 tablet by mouth daily. Do not start before June 28, 2022. 30 tablet 0   • metoPROLOL succinate (TOPROL-XL) 25 MG 24 hr tablet Take 1 tablet by mouth daily. 90 tablet 3   • atorvastatin (LIPITOR) 80 MG tablet Take 1 tablet by mouth nightly. 90 tablet 3     No current facility-administered medications for this encounter.       Allergies as of 07/21/2022   • (No Known Allergies)       Social History:   Tobacco Use: Xavi Vieyra  reports that he has quit smoking. His smoking use included cigars. He has a 1.50 pack-year smoking history. He has never used smokeless tobacco.   Alcohol Use: Xavi Vieyra  reports previous alcohol use.   Drug Use: Xavi Vieyra  reports previous drug use.  Resides In: [address] [place] [id number]  Martial Status:  Single [1]  Number of Children:    Occupation:    Contact Information:  [phone] (home)     Family History   Family history unknown: Yes        Review of Symptoms:    Review of Systems - Oncology   Gastrointestinal: Positive for constipation (prune juice).   Genitourinary: Positive for nocturia.          EXAM, IMAGING, LABS     ECOG Performance Status: 1-Restricted in physically strenuous activity but ambulatory and able to carry out work of a light or sedentary nature, e.g., light house work, office work  Vitals:    07/21/22 1314   BP: 122/74   BP Location: LUE - Left upper extremity   Patient Position: Sitting   Cuff Size: Regular   Pulse: 83   Resp: 18   Temp: 96.4 °F (35.8 °C)   TempSrc: Temporal   SpO2: 98%   Weight: 71.2 kg (156 lb 15.5 oz)   PainSc:  0     Body mass index is 24.58 kg/m².     The Karnofsky performance scale today is 90, Able to carry on normal activity; minor signs or symptoms of disease (ECOG equivalent 0)  Physical Exam  Vitals reviewed.   Constitutional:       Appearance: Normal appearance. He  is normal weight.   HENT:      Head: Normocephalic and atraumatic.      Nose: Nose normal.      Mouth/Throat:      Mouth: Mucous membranes are moist.      Pharynx: Oropharynx is clear.        Comments: L tonsil seems more full than R but no ulcerated mass seen per se, just felt and more exophytic  Upper and lower dentures.     Neck: Normal range of motion.   Eyes:      Extraocular Movements: Extraocular movements intact.      Conjunctiva/sclera: Conjunctivae normal.   Neck:        Comments: 2x2cm hard submandibular LN, with lateral and inferior 7x7cm firm conglomeration of level II/III LN on L.  Full mobility of neck.  No skin ulceration.  Pulmonary:      Effort: Pulmonary effort is normal.      Breath sounds: Normal breath sounds.   Abdominal:      General: Abdomen is flat.      Palpations: Abdomen is soft.   Musculoskeletal:         General: Normal range of motion.   Skin:     General: Skin is warm and dry.   Neurological:      General: No focal deficit present.   Psychiatric:         Thought Content: Thought content normal.         Judgment: Judgment normal.          Imaging: I personally reviewed the patient’s diagnostic images.  The findings are as described above.    MRI:  === 06/21/22 ===    MRA NECK W WO CONTRAST    - Narrative -  EXAM: Pre- and postcontrast MR angiography of the neck vessels    CLINICAL INDICATION: Dizziness and vomiting.    TECHNIQUE: MR angiography of the neck vessels was performed using  two-dimensional time-of-flight technique with additional post gadolinium  imaging obtained from the aortic arch to the [place] of [place] after the  infusion of 7.1 cc of intravenous Gadavist. Stenosis evaluation utilizes  the NASCET criteria.    COMPARISON: None.  Comparison CT angiography is dated 07/01/2021 and  comparison MR angiography is dated 07/07/2021.    FINDINGS: Stenting of the right internal carotid artery is noted with  associated flow gap formation.  There is no significant stenosis  or  occlusion within the cervical right internal carotid artery.  Significant  atherosclerotic changes are identified within the left carotid bifurcation  and proximal left internal carotid artery with reidentification of short  segment stenosis involving the proximal left internal carotid artery  measuring approximately 60-70% by diameter.  Reidentified are small  vertebral and basilar arteries with atherosclerotic changes within the  basilar arteries bilaterally.    - Impression -  1.  Stenting of the proximal right internal carotid artery.  2.  Atherosclerotic changes with short segment stenosis involving the  proximal left internal carotid artery measuring approximately 60-70% by  diameter.  2.  Reidentified is atherosclerotic changes within the vertebral arteries  with a hypoplastic basilar artery.    Electronically Signed by: LADY JANE M.D.  Signed on: 6/22/2022 10:28 AM    ___________________________________________________________________________    MRA HEAD WO CONTRAST    - Narrative -  EXAM: Noncontrast MR angiography of the intracranial vessels    CLINICAL INDICATION: Dizziness and vomiting.    TECHNIQUE: MR angiography of the intracranial vessels was performed using  three-dimensional time of flight technique.    COMPARISON: None.  [place] CT angiography is dated 07/01/2021 and  comparison cerebral angiography is dated 07/07/2021.    FINDINGS: Reidentified is extensive atherosclerotic disease involving the  visualized vertebral and basilar arteries with flow gap formation in the  distal basilar artery.  There are no new abnormalities within the anterior  or posterior intracranial circulations.  Atherosclerotic changes are  identified within the carotid siphons bilaterally.  There are no aneurysms  or normal collections of vessels.  There are bilateral fetal origins of the  posterior cerebral arteries.    - Impression -  1. Reidentified is multifocal vertebral artery stenosis bilaterally with  a  hypoplastic basilar artery.  There is no significant stenosis or occlusion  within the intracranial anterior and posterior circulations.    Electronically Signed by: LADY JANE M.D.  Signed on: 6/22/2022 10:24 AM    ___________________________________________________________________________    MRI BRAIN W WO CONTRAST    - Narrative -  EXAM: Pre and postcontrast MRI examination of the brain    CLINICAL INDICATION: Dizziness and vomiting.    TECHNIQUE: Multiplanar, multiecho images were obtained of the brain before  and after the infusion of 7.1 mL of intravenous Gadavist.    COMPARISON: None.  Comparison CT examination is dated 06/21/2022.    FINDINGS: There are regions of subcortical high signal diffusion weighted  imaging within the cerebellar hemispheres bilaterally, more prominent on  the left than the right.  There is corresponding low signal on ADC map  without associated hemorrhage.    There is age-appropriate cerebral and cerebellar atrophy without midline  shift or mass effect.  There are no abnormal collections of intra-axial or  extra-axial blood or fluid. T1-weighted sagittal images show the midline  structures to be intact. The craniovertebral and cervical medullary  junctions are normal. There is no restricted intracranial diffusion. The  posterior fossa, brainstem, and intracranial flow voids are otherwise  unremarkable. Scattered regions of abnormal high T2 signal are noted within  the supratentorial white matter. There is no MRI evidence of intracranial  hemorrhage, mass, or additional acute infarction. The mastoid air cells and  paranasal sinuses are well aerated.    - Impression -  1.  Nonhemorrhagic cerebellar infarcts, as detailed above.  2.  Age-appropriate cerebral and cerebellar atrophy are identified with  chronic supratentorial white matter ischemic demyelination.    Electronically Signed by: LADY JANE M.D.  Signed on: 6/22/2022 10:16 AM    CT:  === 06/21/22 ===    CT  HEAD WO CONTRAST    - Narrative -  EXAM: CT HEAD WO CONTRAST    CLINICAL INDICATION: Lightheadedness    COMPARISON:  07/01/2021    TECHNIQUE: Axial thin sections through the head were acquired without  contrast. mA and/or kVp was adjusted for patient size.    FINDINGS: There is an interval small cortical ischemic infarct within the  posterolateral left cerebellar hemisphere.  This was not present on the  prior study.  There is no hemorrhage.    The cerebral hemispheres are normal.  The ventricular system is normal.    The skull base and overlying calvarium are normal.  The mastoid air cells  and tympanic cavities are clear.    - Impression -  Nonhemorrhagic, interval, small cortical ischemic infarct within the  posterolateral left cerebellar hemisphere.    Electronically Signed by: ROMEO CARRILLO M.D.  Signed on: 6/21/2022 7:52 AM      === 06/30/21 ===    CTA HEAD AND NECK W WO CONTRAST    - Narrative -  EXAM:  CTA HEAD AND NECK W WO CONTRAST    CLINICAL INDICATION: Carotid artery stenosis    COMPARISON:  Carotid duplex ultrasound exam dated 07/01/2021    TECHNIQUE: Multiple axial sections were performed through the brain pre- IV  contrast infusion.  CT angiography was then performed following high rate  bolus IV contrast infusion.  MPR and MIP reconstructions were performed.  3-D reconstructions were performed on an independent workstation.  NASCET  criteria was utilized to determine the degree of cervical internal carotid  artery stenosis.  MA and/or kVp was adjusted for patient size.    CONTRAST: 100 mL of Omnipaque 350 was administered intravenously.    FINDINGS:  There is moderate cerebellar atrophy.  The ventricles are normal  in size.  There is no intracranial hemorrhage or pathologic extra-axial  fluid collection.  There is no evidence of acute cortical infarction.  There are chronic microvascular ischemic changes.  There is no evidence of  intracranial mass.  There is no herniation.  The basilar cisterns  are  normally visualized.  There is no skull abnormality.  The paranasal sinuses  and mastoid air cells are clear.    There is a 2.1 x 2.9 cm mass with numerous punctate calcifications along  the jugular chain anterior to the anterior margin of the  sternocleidomastoid muscle posterior to the superior aspect of the left  submandibular gland.  There are no other similar appearing masses/lymph  nodes.  The thyroid gland is normal in appearance.  The submandibular  glands are normal.    The right parotid gland is normal in appearance.  The left parotid gland is  atrophic.    There is calcific atherosclerotic disease involving the aortic arch.  There is no stenosis of the great vessels at their origins from the aortic  arch.  There is no common carotid artery stenosis bilaterally.    There are extensive calcified plaques involving the origin of the cervical  right internal carotid artery.  At its narrowest point, the right internal  carotid artery measures 0.4 mm in diameter.  A normal appearing distal  right internal carotid artery segment measures 5.1 mm in diameter.  This  indicates a stenosis of 92%.  On the left, there are mixed density plaques  involving the origin of the internal carotid artery. At its narrowest  point, the internal carotid artery measures 1.3 mm in diameter.  A distal  left internal carotid artery segment measures 6.2 mm in diameter.  This  indicates a stenosis of 80%.    There is focal vertebral artery stenosis bilaterally.  Bilateral vertebral  arteries are small.    The basilar artery is diffusely small.  This is due to bilateral posterior  cerebral arteries having their origins from the internal carotid arteries.  There is calcific atherosclerotic disease involving the cavernous internal  carotid arteries bilaterally without significant stenosis.  There is no  anterior or middle cerebral artery stenosis.  There is no posterior  cerebral artery stenosis.  There is no intracranial aneurysm or  high flow  vascular malformation.    - Impression -  1.  Severe bilateral cervical internal carotid artery stenosis measuring  92% on the right and 80% on the left.  The right internal carotid artery  plaque is densely calcified.  The left internal carotid artery plaque is  mixed fatty density and calcification.  2.  No acute intracranial abnormality without evidence of acute infarction  or hemorrhage.  3.  2.9 cm mass with punctate calcifications within the left neck likely  represents pathologic lymph node.  The punctate calcifications most  commonly reflect granulomatous disease.  Metastatic thyroid carcinoma can  have similar appearing calcifications.  The thyroid gland appears normal on  CT scan.    Electronically Signed by: FLAVIA YOUNGER M.D.  Signed on: 7/1/2021 9:35 PM    X-ray:  === 06/21/22 ===    XR CHEST PA OR AP 1 VIEW    - Narrative -  EXAM: XR CHEST PA OR AP 1 VIEW    CLINICAL INDICATION: Chest pain    COMPARISON: 07/25/2021    FINDINGS: Interval clearing of previously demonstrated bilateral lower lobe  infiltrates and bilateral pleural effusions.  Currently, the lungs are  clear.  There are no airspace infiltrates or pleural effusions.  Heart size  and pulmonary vascularity are normal.  Sternotomy wires are present.    - Impression -  No active cardiopulmonary disease.      Electronically Signed by: ROMEO CARRILLO M.D.  Signed on: 6/21/2022 7:17 AM      === 07/22/21 ===    XR CHEST PA AND LATERAL 2 VIEWS    - Narrative -  EXAM: XR CHEST PA AND LATERAL 2 VIEWS    CLINICAL INDICATION: Chest tubes removed.    COMPARISON: 07/24/2021    - Impression -  Findings/impression: Thoracostomy tubes removed in the interim.  There are  persistent small bilateral pleural effusions, right greater than left.  Mild persistent interstitial vascular prominence may represent mild  pulmonary edema.  No pneumothorax.  There is bibasilar  atelectasis/consolidation, unchanged.  Moderate cardiomegaly is stable.  Median  sternotomy wires are aligned.    Electronically Signed by: DEIDRA GOODEN M.D.  Signed on: 7/25/2021 1:44 PM    ___________________________________________________________________________    XR CHEST PA OR AP 1 VIEW    - Narrative -  EXAM: XR CHEST PA OR AP 1 VIEW    CLINICAL INDICATION: Cardiac surgery    COMPARISON: 07/23/2021    - Impression -  Findings/impression: Frontal view of the chest is submitted.  Multiple  tubes and lines overlie the patient.    Left basilar chest tube and mediastinal drain remain stable.    There are small bilateral pleural effusions with bibasilar atelectasis.  Mild perihilar and interstitial vascular prominence may represent mild  pulmonary edema.  No pneumothorax.  Cardiomegaly is stable.    Electronically Signed by: DEIDRA GOODEN M.D.  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  He has a large conglomeration of LN/large LN on the L neck without symptoms other than bulge.      Plan:    1. Bx of L posterior rib 10th to determine that he's metastatic and for PDL-1 status to inform immunotherapy.  2. He's asymptomatic, and he could start CHT/immunotherapy first to see if shrinkage.  3. After re-staging, he could receive XRT to the L neck if progression or if increasing symptoms.  This could be GRID or Quad Shot XRT for palliation.  4. Treatment coordination along with timing of trip to [place] with Dr. Neal  5. He has multiple co-morbidities including bilateral carotid stents, making me worried about further stenosis with XRT to the head and neck region.    I had a long and detailed discussion with the patient regarding Xavi's diagnosis, recent radiologic and pathologic study results, and various therapeutic options. I went over  the risks, benefits, and the rationale of radiation treatment with him in great detail.  I answered Xavi's questions to the best of my ability.  I explained the short-term and long-term side effects in layman's terms.  The patient was able to repeat the plan back to me and thereby express understanding.      Xavi is free to contact our office with any questions or concerns at any time.    Total time spent with patient:  >60 minutes.     >50% of the total time was spent counseling and/or coordinating care.      Yessica Ramirez MD   Medical Director, Radiation Oncology  [facility]  [phone] dept  [phone] fax     This note was created using the Dragon voice recognition system. Errors in content may be related to improper recognition of the system. Effort to review and correct the note has been made but irregularities may still be present.    Components of medical decision making for this visit: Review of patients imaging, laboratory tests, referring provider's clinical documentation, discussion of current symptoms and relevant interval health issues, ordering of future diagnostic studies, communication with referring providers and documentation of visit.    Note to patient: The 21st Century Cures Act makes medical notes like these available to patients in the interest of transparency. However, be advised this is a medical document. It is intended as peer to peer communication. It is written in medical language and may contain abbreviations or verbiage that are unfamiliar. It may appear blunt or direct. Medical documents are intended to carry relevant information, facts as evident, and the clinical opinion of the practitioner

## 2024-04-17 ENCOUNTER — OFFICE VISIT (OUTPATIENT)
Dept: UROLOGY | Facility: CLINIC | Age: 39
End: 2024-04-17
Payer: MEDICAID

## 2024-04-17 VITALS
DIASTOLIC BLOOD PRESSURE: 80 MMHG | SYSTOLIC BLOOD PRESSURE: 127 MMHG | HEIGHT: 72 IN | WEIGHT: 245 LBS | BODY MASS INDEX: 33.18 KG/M2 | HEART RATE: 85 BPM

## 2024-04-17 DIAGNOSIS — Z79.890 LONG-TERM CURRENT USE OF TESTOSTERONE REPLACEMENT THERAPY: ICD-10-CM

## 2024-04-17 DIAGNOSIS — R79.89 LOW TESTOSTERONE: Primary | ICD-10-CM

## 2024-04-17 LAB
BILIRUB BLD-MCNC: NEGATIVE MG/DL
CLARITY, POC: CLEAR
COLOR UR: YELLOW
EXPIRATION DATE: ABNORMAL
GLUCOSE UR STRIP-MCNC: ABNORMAL MG/DL
KETONES UR QL: NEGATIVE
LEUKOCYTE EST, POC: NEGATIVE
Lab: ABNORMAL
NITRITE UR-MCNC: NEGATIVE MG/ML
PH UR: 5.5 [PH] (ref 5–8)
PROT UR STRIP-MCNC: NEGATIVE MG/DL
RBC # UR STRIP: NEGATIVE /UL
SP GR UR: 1.02 (ref 1–1.03)
UROBILINOGEN UR QL: ABNORMAL

## 2024-04-17 RX ORDER — TESTOSTERONE CYPIONATE 200 MG/ML
200 VIAL (ML) INTRAMUSCULAR
Qty: 10 ML | Refills: 1 | Status: SHIPPED | OUTPATIENT
Start: 2024-04-17 | End: 2024-10-14

## 2024-04-17 NOTE — PROGRESS NOTES
"Chief Complaint  LOW TESTOSTERONE (NEW PT)    Subjective no acute distress        Matt Albarran presents to [facility] UROLOG[facility]  History of Present Illness    38-year-old white male has lack of energy for 6 years now.  Patient works at third shift and he always blamed that to his shift.  Patient had testosterone done times twice and on 3/18/2024 his total testosterone was 202 NG/DL and free testosterone was 6.5 PG/mL.  On 3/25/2024 his testosterone is 173 NG/DL and free testosterone is 2.3 PG/mL.  Patient has been urinating without difficulties and there is no history of prostate cancer in the family.  Patient has no lack of sex drive and his erections are fine.  The only problem he has is lack of energy.  Patient has had vasectomy and infertility is no concern to the patient.    Objective no acute distress  Vital Signs:   /80 (BP Location: Left arm, Patient Position: Sitting, Cuff Size: Adult)   Pulse 85   Ht 182.9 cm (72.01\")   Wt 111 kg (245 lb)   BMI 33.22 kg/m²     No Known Allergies   Past medical history:  has a past medical history of Arthritis, Diabetes mellitus, Hyperlipidemia, and Hypertension.   Past surgical history:  has a past surgical history that includes Vasectomy.  Personal history: family history includes Diabetes in his father.  Social history:  reports that he has never smoked. He has never been exposed to tobacco smoke. He has never used smokeless tobacco. He reports current alcohol use of about 1.0 standard drink of alcohol per week. He reports that he does not use drugs.    Review of Systems    Please see past surgical medical history    Physical Exam  Constitutional:       General: He is not in acute distress.     Appearance: Normal appearance. He is obese. He is not ill-appearing or toxic-appearing.   HENT:      Head: Normocephalic and atraumatic.      Ears:      Comments: No loss of hearing  Cardiovascular:      Rate and Rhythm: Normal rate and regular rhythm. "      Pulses: Normal pulses.      Heart sounds: Normal heart sounds. No murmur heard.  Pulmonary:      Effort: Pulmonary effort is normal. No respiratory distress.      Breath sounds: Normal breath sounds. No rhonchi or rales.   Abdominal:      Palpations: Abdomen is soft. There is no mass.      Tenderness: There is no abdominal tenderness. There is no right CVA tenderness or left CVA tenderness.   Genitourinary:     Comments: Uncircumcised normal penis.    Right and left scrotum is normal.    Right left testicle and epididymis is normal.    ALISHA.  Prostate gland is just about 15 g and benign  Musculoskeletal:         General: No swelling. Normal range of motion.      Cervical back: Normal range of motion and neck supple. No rigidity or tenderness.   Lymphadenopathy:      Cervical: No cervical adenopathy.   Skin:     General: Skin is warm.      Coloration: Skin is not jaundiced.   Neurological:      General: No focal deficit present.      Mental Status: He is alert and oriented to person, place, and time.      Motor: No weakness.      Gait: Gait normal.   Psychiatric:         Mood and Affect: Mood normal.         Behavior: Behavior normal.         Thought Content: Thought content normal.         Judgment: Judgment normal.        Result Review :                 Assessment and Plan    Diagnoses and all orders for this visit:    1. Low testosterone (Primary)  -     POC Urinalysis Dipstick, Automated  -     Testosterone Cypionate 200 MG/ML solution; Inject 1 mL as directed Every 14 (Fourteen) Days for 180 days.  Dispense: 10 mL; Refill: 1    2. Long-term current use of testosterone replacement therapy  -     Testosterone Cypionate 200 MG/ML solution; Inject 1 mL as directed Every 14 (Fourteen) Days for 180 days.  Dispense: 10 mL; Refill: 1    I have informed him that low testosterone is a cardiac risk.  Will start him on testosterone cypionate 200 mg IM q. 14 days.  Patient can give the injection himself.  Risk of the  testosterone is discussedI have discussed the side effects of testosterone with the patient.  Patient can have borderline hypertension which can give rise to cardiac issues.  Patient can have erythrocytosis and elevated erythropoietin and may have to donate blood depending upon the CBC.  Testosterone perse he has no relationship with inducing prostate cancer but if patient develop prostate cancer it will spread the tumor and its growth.  Patient can have thrombophlebitis of lower extremities and even pulmonary embolus.  Patient can also have sleep apnea and testosterone will exaggerate the situation.  In some trials patient has developed chronic kidney disease with testosterone therapy.  Patient accepts the risk.  I am going to recheck him in 2 and half months after doing his blood test in 2 months.     Brief Urine Lab Results  (Last result in the past 365 days)        Color   Clarity   Blood   Leuk Est   Nitrite   Protein   CREAT   Urine HCG        04/17/24 1528 Yellow   Clear   Negative   Negative   Negative   Negative                    Follow Up   No follow-ups on file.  Patient was given instructions and counseling regarding his condition or for health maintenance advice. Please see specific information pulled into the AVS if appropriate.     Fozia Bansal MD

## 2024-04-19 RX ORDER — LISINOPRIL 20 MG/1
20 TABLET ORAL DAILY
Qty: 90 TABLET | Refills: 0 | Status: SHIPPED | OUTPATIENT
Start: 2024-04-19

## 2024-04-19 RX ORDER — EMPAGLIFLOZIN 25 MG/1
25 TABLET, FILM COATED ORAL DAILY
Qty: 90 TABLET | Refills: 0 | Status: SHIPPED | OUTPATIENT
Start: 2024-04-19

## 2024-05-18 ENCOUNTER — HOSPITAL ENCOUNTER (EMERGENCY)
Facility: HOSPITAL | Age: 39
Discharge: HOME | End: 2024-05-18
Attending: EMERGENCY MEDICINE
Payer: COMMERCIAL

## 2024-05-18 VITALS
WEIGHT: 240.52 LBS | OXYGEN SATURATION: 99 % | SYSTOLIC BLOOD PRESSURE: 138 MMHG | BODY MASS INDEX: 32.58 KG/M2 | DIASTOLIC BLOOD PRESSURE: 89 MMHG | RESPIRATION RATE: 18 BRPM | HEIGHT: 72 IN | TEMPERATURE: 98.3 F | HEART RATE: 98 BPM
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
## 2024-05-18 DIAGNOSIS — T14.8XXA ABRASION: ICD-10-CM

## 2024-05-18 PROCEDURE — 99283 EMERGENCY DEPT VISIT LOW MDM: CPT

## 2024-05-18 NOTE — Clinical Note
[facility] EMERGENCY ROOM  [address] [place] [address]  Phone: [phone]  Fax: [phone]    Matt Albarran was seen and treated in our emergency department on 5/18/2024.  He may return to work on 05/20/2024.         Thank you for choosing [facility].    Anthony Holt MD
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
## 2024-07-10 RX ORDER — ATORVASTATIN CALCIUM 40 MG/1
40 TABLET, FILM COATED ORAL DAILY
Qty: 90 TABLET | Refills: 1 | Status: SHIPPED | OUTPATIENT
Start: 2024-07-10

## 2024-07-10 NOTE — TELEPHONE ENCOUNTER
Caller: NITESH FERRARO    Relationship: Emergency Contact    Best call back number: [phone]     Requested Prescriptions:   Requested Prescriptions     Pending Prescriptions Disp Refills    atorvastatin (LIPITOR) 40 MG tablet 90 tablet 0     Sig: Take 1 tablet by mouth Daily.        Pharmacy where request should be sent: [address] - [phone] [facility] [phone] FX     Last office visit with prescribing clinician: 3/18/2024   Last telemedicine visit with prescribing clinician: Visit date not found   Next office visit with prescribing clinician: Visit date not found     Does the patient have less than a 3 day supply:  [x] Yes  [] No    Would you like a call back once the refill request has been completed: [] Yes [] No    If the office needs to give you a call back, can they leave a voicemail: [] Yes [] No    Rosanne Kunz Rep   07/10/24 12:06 EDT

## 2024-09-05 RX ORDER — LISINOPRIL 20 MG/1
20 TABLET ORAL DAILY
Qty: 90 TABLET | Refills: 0 | Status: SHIPPED | OUTPATIENT
Start: 2024-09-05 | End: 2024-09-09 | Stop reason: SDUPTHER

## 2024-09-09 RX ORDER — LISINOPRIL 20 MG/1
20 TABLET ORAL DAILY
Qty: 90 TABLET | Refills: 0 | Status: SHIPPED | OUTPATIENT
Start: 2024-09-09

## 2024-09-17 ENCOUNTER — OFFICE VISIT (OUTPATIENT)
Dept: INTERNAL MEDICINE | Facility: CLINIC | Age: 39
End: 2024-09-17
Payer: MEDICAID

## 2024-09-17 ENCOUNTER — TELEPHONE (OUTPATIENT)
Dept: INTERNAL MEDICINE | Facility: CLINIC | Age: 39
End: 2024-09-17

## 2024-09-17 VITALS
SYSTOLIC BLOOD PRESSURE: 120 MMHG | OXYGEN SATURATION: 99 % | WEIGHT: 244.2 LBS | DIASTOLIC BLOOD PRESSURE: 82 MMHG | TEMPERATURE: 97.1 F | HEIGHT: 72 IN | BODY MASS INDEX: 33.08 KG/M2 | HEART RATE: 71 BPM

## 2024-09-17 DIAGNOSIS — M25.561 CHRONIC PAIN OF RIGHT KNEE: ICD-10-CM

## 2024-09-17 DIAGNOSIS — R79.89 LOW TESTOSTERONE IN MALE: ICD-10-CM

## 2024-09-17 DIAGNOSIS — E78.5 HYPERLIPIDEMIA, UNSPECIFIED HYPERLIPIDEMIA TYPE: ICD-10-CM

## 2024-09-17 DIAGNOSIS — E11.65 TYPE 2 DIABETES MELLITUS WITH HYPERGLYCEMIA, WITHOUT LONG-TERM CURRENT USE OF INSULIN: Primary | ICD-10-CM

## 2024-09-17 DIAGNOSIS — G89.29 CHRONIC PAIN OF RIGHT KNEE: ICD-10-CM

## 2024-09-17 DIAGNOSIS — I10 ESSENTIAL HYPERTENSION: ICD-10-CM

## 2024-09-17 LAB
ALBUMIN SERPL-MCNC: 4.6 G/DL (ref 3.5–5.2)
ALBUMIN/GLOB SERPL: 1.9 G/DL
ALP SERPL-CCNC: 62 U/L (ref 39–117)
ALT SERPL W P-5'-P-CCNC: 35 U/L (ref 1–41)
ANION GAP SERPL CALCULATED.3IONS-SCNC: 11.5 MMOL/L (ref 5–15)
AST SERPL-CCNC: 19 U/L (ref 1–40)
BASOPHILS # BLD AUTO: 0.06 10*3/MM3 (ref 0–0.2)
BASOPHILS NFR BLD AUTO: 1 % (ref 0–1.5)
BILIRUB SERPL-MCNC: 1.1 MG/DL (ref 0–1.2)
BUN SERPL-MCNC: 17 MG/DL (ref 6–20)
BUN/CREAT SERPL: 18.3 (ref 7–25)
CALCIUM SPEC-SCNC: 9.8 MG/DL (ref 8.6–10.5)
CHLORIDE SERPL-SCNC: 99 MMOL/L (ref 98–107)
CHOLEST SERPL-MCNC: 143 MG/DL (ref 0–200)
CO2 SERPL-SCNC: 25.5 MMOL/L (ref 22–29)
CREAT SERPL-MCNC: 0.93 MG/DL (ref 0.76–1.27)
DEPRECATED RDW RBC AUTO: 41.5 FL (ref 37–54)
EGFRCR SERPLBLD CKD-EPI 2021: 109.1 ML/MIN/1.73
EOSINOPHIL # BLD AUTO: 0.07 10*3/MM3 (ref 0–0.4)
EOSINOPHIL NFR BLD AUTO: 1.2 % (ref 0.3–6.2)
ERYTHROCYTE [DISTWIDTH] IN BLOOD BY AUTOMATED COUNT: 13.2 % (ref 12.3–15.4)
GLOBULIN UR ELPH-MCNC: 2.4 GM/DL
GLUCOSE SERPL-MCNC: 127 MG/DL (ref 65–99)
HBA1C MFR BLD: 6.8 % (ref 4.8–5.6)
HCT VFR BLD AUTO: 47.1 % (ref 37.5–51)
HDLC SERPL-MCNC: 25 MG/DL (ref 40–60)
HGB BLD-MCNC: 16 G/DL (ref 13–17.7)
IMM GRANULOCYTES # BLD AUTO: 0.01 10*3/MM3 (ref 0–0.05)
IMM GRANULOCYTES NFR BLD AUTO: 0.2 % (ref 0–0.5)
LDLC SERPL CALC-MCNC: 83 MG/DL (ref 0–100)
LDLC/HDLC SERPL: 3.06 {RATIO}
LYMPHOCYTES # BLD AUTO: 1.49 10*3/MM3 (ref 0.7–3.1)
LYMPHOCYTES NFR BLD AUTO: 24.5 % (ref 19.6–45.3)
MCH RBC QN AUTO: 29.5 PG (ref 26.6–33)
MCHC RBC AUTO-ENTMCNC: 34 G/DL (ref 31.5–35.7)
MCV RBC AUTO: 86.9 FL (ref 79–97)
MONOCYTES # BLD AUTO: 0.37 10*3/MM3 (ref 0.1–0.9)
MONOCYTES NFR BLD AUTO: 6.1 % (ref 5–12)
NEUTROPHILS NFR BLD AUTO: 4.07 10*3/MM3 (ref 1.7–7)
NEUTROPHILS NFR BLD AUTO: 67 % (ref 42.7–76)
NRBC BLD AUTO-RTO: 0 /100 WBC (ref 0–0.2)
PLATELET # BLD AUTO: 197 10*3/MM3 (ref 140–450)
PMV BLD AUTO: 11.3 FL (ref 6–12)
POTASSIUM SERPL-SCNC: 4.5 MMOL/L (ref 3.5–5.2)
PROT SERPL-MCNC: 7 G/DL (ref 6–8.5)
RBC # BLD AUTO: 5.42 10*6/MM3 (ref 4.14–5.8)
SODIUM SERPL-SCNC: 136 MMOL/L (ref 136–145)
TRIGL SERPL-MCNC: 208 MG/DL (ref 0–150)
TSH SERPL DL<=0.05 MIU/L-ACNC: 2.18 UIU/ML (ref 0.27–4.2)
VLDLC SERPL-MCNC: 35 MG/DL (ref 5–40)
WBC NRBC COR # BLD AUTO: 6.07 10*3/MM3 (ref 3.4–10.8)

## 2024-09-17 PROCEDURE — 1159F MED LIST DOCD IN RCRD: CPT | Performed by: NURSE PRACTITIONER

## 2024-09-17 PROCEDURE — 3074F SYST BP LT 130 MM HG: CPT | Performed by: NURSE PRACTITIONER

## 2024-09-17 PROCEDURE — 3044F HG A1C LEVEL LT 7.0%: CPT | Performed by: NURSE PRACTITIONER

## 2024-09-17 PROCEDURE — 83036 HEMOGLOBIN GLYCOSYLATED A1C: CPT | Performed by: NURSE PRACTITIONER

## 2024-09-17 PROCEDURE — 80050 GENERAL HEALTH PANEL: CPT | Performed by: NURSE PRACTITIONER

## 2024-09-17 PROCEDURE — 3079F DIAST BP 80-89 MM HG: CPT | Performed by: NURSE PRACTITIONER

## 2024-09-17 PROCEDURE — 99214 OFFICE O/P EST MOD 30 MIN: CPT | Performed by: NURSE PRACTITIONER

## 2024-09-17 PROCEDURE — 80061 LIPID PANEL: CPT | Performed by: NURSE PRACTITIONER

## 2024-09-17 PROCEDURE — 1160F RVW MEDS BY RX/DR IN RCRD: CPT | Performed by: NURSE PRACTITIONER

## 2024-09-18 ENCOUNTER — PRIOR AUTHORIZATION (OUTPATIENT)
Dept: INTERNAL MEDICINE | Facility: CLINIC | Age: 39
End: 2024-09-18
Payer: MEDICAID

## 2025-01-02 RX ORDER — LISINOPRIL 20 MG/1
20 TABLET ORAL DAILY
Qty: 90 TABLET | Refills: 0 | Status: SHIPPED | OUTPATIENT
Start: 2025-01-02

## 2025-01-02 NOTE — TELEPHONE ENCOUNTER
Caller: NITESH FERRARO    Relationship: Emergency Contact    Best call back number: [phone]    Requested Prescriptions:   Requested Prescriptions     Pending Prescriptions Disp Refills    lisinopril (PRINIVIL,ZESTRIL) 20 MG tablet 90 tablet 0     Sig: Take 1 tablet by mouth Daily.        Pharmacy where request should be sent: [address] [phone] [facility] [phone] FX     Last office visit with prescribing clinician: 9/17/2024   Last telemedicine visit with prescribing clinician: Visit date not found   Next office visit with prescribing clinician: 3/17/2025     Additional details provided by patient:   PATIENT IS CURRENTLY OUT OF THIS MEDICATION.     Does the patient have less than a 3 day supply:  [x] Yes  [] No    Would you like a call back once the refill request has been completed: [] Yes [] No    If the office needs to give you a call back, can they leave a voicemail: [] Yes [] No    Rosanne Amanda Rep   01/02/25 12:08 EST         DELETE AFTER READING TO PATIENT: “Thank you for sharing this information with me. I will send a message to the clinical team. Please allow 48 hours for the clinical staff to follow up on this request.”

## 2025-01-20 NOTE — TELEPHONE ENCOUNTER
Caller: Matt Albarran    Relationship: Self    Best call back number: [phone]     Requested Prescriptions:   Requested Prescriptions     Pending Prescriptions Disp Refills    empagliflozin (Jardiance) 25 MG tablet tablet 90 tablet 1     Sig: Take 1 tablet by mouth Daily.    metFORMIN (GLUCOPHAGE) 1000 MG tablet 180 tablet 1     Sig: Take 1 tablet by mouth 2 (Two) Times a Day With Meals for 90 days.        Pharmacy where request should be sent: [address] [phone] [facility] [phone] FX     Last office visit with prescribing clinician: 9/17/2024   Last telemedicine visit with prescribing clinician: Visit date not found   Next office visit with prescribing clinician: 3/17/2025     Additional details provided by patient:     Does the patient have less than a 3 day supply:  [x] Yes  [] No        Mary Kay Edwards, PCT   01/20/25 15:14 EST

## 2025-04-08 ENCOUNTER — APPOINTMENT (OUTPATIENT)
Dept: ULTRASOUND IMAGING | Facility: HOSPITAL | Age: 40
End: 2025-04-08
Payer: MEDICAID

## 2025-04-08 ENCOUNTER — HOSPITAL ENCOUNTER (EMERGENCY)
Facility: HOSPITAL | Age: 40
Discharge: HOME | End: 2025-04-08
Attending: EMERGENCY MEDICINE | Admitting: EMERGENCY MEDICINE
Payer: MEDICAID

## 2025-04-08 VITALS
TEMPERATURE: 98.3 F | HEIGHT: 72 IN | SYSTOLIC BLOOD PRESSURE: 138 MMHG | DIASTOLIC BLOOD PRESSURE: 91 MMHG | RESPIRATION RATE: 12 BRPM | BODY MASS INDEX: 32.97 KG/M2 | OXYGEN SATURATION: 97 % | HEART RATE: 95 BPM | WEIGHT: 243.39 LBS

## 2025-04-08 DIAGNOSIS — N45.1 EPIDIDYMITIS: Primary | ICD-10-CM

## 2025-04-08 PROCEDURE — 99284 EMERGENCY DEPT VISIT MOD MDM: CPT

## 2025-04-08 PROCEDURE — 76870 US EXAM SCROTUM: CPT

## 2025-04-08 RX ORDER — CEPHALEXIN 500 MG/1
500 CAPSULE ORAL 3 TIMES DAILY
Qty: 21 CAPSULE | Refills: 0 | Status: SHIPPED | OUTPATIENT
Start: 2025-04-08

## 2025-04-08 RX ORDER — TRAMADOL HYDROCHLORIDE 50 MG/1
50 TABLET ORAL EVERY 6 HOURS PRN
Qty: 15 TABLET | Refills: 0 | Status: SHIPPED | OUTPATIENT
Start: 2025-04-08 | End: 2025-04-11

## 2025-04-09 NOTE — ED PROVIDER NOTES
Time: 11:28 PM EDT  Date of encounter:  4/8/2025  Independent Historian/Clinical History and Information was obtained by:   Patient    History is limited by: N/A    Chief Complaint: Testicular pain      History of Present Illness:  Patient is a 39 y.o. year old male who presents to the emergency department for evaluation of right testicular pain that is gotten worse.  Patient denies fever and chills.  Patient has no chest pain or shortness of breath.  Patient denies trauma.  Patient has no nausea, vomiting, or diarrhea.  Patient denies dysuria and urinary frequency.      Patient Care Team  Primary Care Provider: Tierra Askew APRN    Past Medical History:     No Known Allergies  Past Medical History:   Diagnosis Date    Arthritis     Diabetes mellitus     Hyperlipidemia     Hypertension      Past Surgical History:   Procedure Laterality Date    VASECTOMY       Family History   Problem Relation Age of Onset    Diabetes Father        Home Medications:  Prior to Admission medications    Medication Sig Start Date End Date Taking? Authorizing Provider   atorvastatin (LIPITOR) 40 MG tablet Take 1 tablet by mouth Daily. 7/10/24   Tierra Askew APRN   cephalexin (KEFLEX) 500 MG capsule Take 1 capsule by mouth 3 (Three) Times a Day. 4/8/25   Jordan Goss MD   empagliflozin (Jardiance) 25 MG tablet tablet Take 1 tablet by mouth Daily. 1/20/25   Tierra Askew APRN   glucose blood test strip 1 each by Other route 2 (Two) Times a Day. Use as instructed 9/17/24   Tierra Askew APRN   lisinopril (PRINIVIL,ZESTRIL) 20 MG tablet Take 1 tablet by mouth Daily. 1/2/25   Tierra Askew APRN   metFORMIN (GLUCOPHAGE) 1000 MG tablet Take 1 tablet by mouth 2 (Two) Times a Day With Meals for 180 days. 1/20/25 7/19/25  Tierra Askew APRN   traMADol (ULTRAM) 50 MG tablet Take 1 tablet by mouth Every 6 (Six) Hours As Needed for Moderate Pain. 4/8/25   Jordan Goss MD        Social History:   Social History     Tobacco  "Use    Smoking status: Never     Passive exposure: Never    Smokeless tobacco: Never   Vaping Use    Vaping status: Never Used   Substance Use Topics    Alcohol use: Yes     Alcohol/week: 1.0 standard drink of alcohol     Types: 1 Shots of liquor per week     Comment: occaisonally     Drug use: Never         Review of Systems:  Review of Systems   Constitutional:  Negative for chills and fever.   HENT:  Negative for congestion, rhinorrhea and sore throat.    Eyes:  Negative for pain and visual disturbance.   Respiratory:  Negative for apnea, cough, chest tightness and shortness of breath.    Cardiovascular:  Negative for chest pain and palpitations.   Gastrointestinal:  Negative for abdominal pain, diarrhea, nausea and vomiting.   Genitourinary:  Positive for testicular pain. Negative for difficulty urinating and dysuria.   Musculoskeletal:  Negative for joint swelling and myalgias.   Skin:  Negative for color change.   Neurological:  Negative for seizures and headaches.   Psychiatric/Behavioral: Negative.     All other systems reviewed and are negative.       Physical Exam:  /85   Pulse 87   Temp 98.3 °F (36.8 °C) (Oral)   Resp 16   Ht 182.9 cm (72\")   Wt 110 kg (243 lb 6.2 oz)   SpO2 93%   BMI 33.01 kg/m²     Physical Exam  Vitals and nursing note reviewed.   Constitutional:       General: He is not in acute distress.     Appearance: Normal appearance. He is not toxic-appearing.   HENT:      Head: Normocephalic and atraumatic.      Jaw: There is normal jaw occlusion.   Eyes:      General: Lids are normal.      Extraocular Movements: Extraocular movements intact.      Conjunctiva/sclera: Conjunctivae normal.      Pupils: Pupils are equal, round, and reactive to light.   Cardiovascular:      Rate and Rhythm: Normal rate and regular rhythm.      Pulses: Normal pulses.      Heart sounds: Normal heart sounds.   Pulmonary:      Effort: Pulmonary effort is normal. No respiratory distress.      Breath sounds: " Normal breath sounds. No wheezing or rhonchi.   Abdominal:      General: Abdomen is flat.      Palpations: Abdomen is soft.      Tenderness: There is no abdominal tenderness. There is no guarding or rebound.   Genitourinary:     Comments: (+) Right testicular tenderness  Musculoskeletal:         General: Normal range of motion.      Cervical back: Normal range of motion and neck supple.      Right lower leg: No edema.      Left lower leg: No edema.   Skin:     General: Skin is warm and dry.   Neurological:      Mental Status: He is alert and oriented to person, place, and time. Mental status is at baseline.   Psychiatric:         Mood and Affect: Mood normal.                    Medical Decision Making:      Comorbidities that affect care:    Diabetes    External Notes reviewed:    Previous Clinic Note: Patient was last seen in clinic for rash.      The following orders were placed and all results were independently analyzed by me:  Orders Placed This Encounter   Procedures    US Scrotum & Testicles       Medications Given in the Emergency Department:  Medications - No data to display     ED Course:         Labs:    Lab Results (last 24 hours)       ** No results found for the last 24 hours. **             Imaging:    US Scrotum & Testicles  Result Date: 4/8/2025  US SCROTUM AND TESTICLES Date of Exam: 4/8/2025 10:00 PM EDT Indication: SWELLING/PAIN. Comparison: Scrotal ultrasound 7/2/2019 Technique: Multiple sonographic images of the scrotum were obtained in transverse and longitudinal planes. Grayscale and color Doppler duplex techniques were utilized.  Doppler spectral analysis was performed. Findings: Right scrotum:  Testis measurements: 4.7 x 3.2 x 2.4 cm. Testis appearance: Microlithiasis. Intact appearing vascularity. No intratesticular masses. Doppler: Normal waveforms Epididymis: Mildly asymmetrically increased vascularity. Other: No significant hydrocele. Small varicocele, similar to prior. Left scrotum:  Testis measurements: 4.5 x 3.1 x 2.0 cm. Testis appearance: Microlithiasis. Intact appearing vascularity. No intratesticular masses. Doppler: Normal waveforms. Epididymis: Normal. Other: No hydrocele or varicocele.     Impression: Mildly asymmetrically increased vascularity of the right epididymis, which may represent mild epididymitis. Bilateral testicular microlithiasis. Small right varicocele, similar to prior Electronically Signed: Garth Carver  4/8/2025 10:34 PM EDT  Workstation ID: [id number]        Differential Diagnosis and Discussion:    Testicular Pain: Differential diagnosis includes but is not limited to epididymitis, orchitis, testicular torsion, testicular tumor, testicular trauma, hydrocele, varicocele, spermatocele, prostatitis, scrotal cellulitis, and urolithiasis.    PROCEDURES:    Ultrasound was performed in the emergency department and the ultrasound impression was interpreted by me.     No orders to display       Procedures    MDM       Ultrasound is consistent with epididymitis.  Patient is resting comfortably, is alert, and is in no distress. The repeat examination is unremarkable and benign. The patient has no signs of sepsis. The patient was started on antibiotics in the emergency department and will be discharged with antibiotics as an outpatient. The patient was counseled to return to the ER for fever >100.5, intractable pain or vomiting, or any other concerns that the may have. The patient has expressed a clear and thorough understanding and agreed to follow up as instructed.              Patient Care Considerations:    None      Consultants/Shared Management Plan:    None    Social Determinants of Health:    Patient is independent, reliable, and has access to care.       Disposition and Care Coordination:    Discharged: The patient is suitable and stable for discharge with no need for consideration of admission.    I have explained the patient´s condition, diagnoses and treatment plan  based on the information available to me at this time. I have answered questions and addressed any concerns. The patient has a good  understanding of the patient´s diagnosis, condition, and treatment plan as can be expected at this point. The vital signs have been stable. The patient´s condition is stable and appropriate for discharge from the emergency department.      The patient will pursue further outpatient evaluation with the primary care physician or other designated or consulting physician as outlined in the discharge instructions. They are agreeable to this plan of care and follow-up instructions have been explained in detail. The patient has received these instructions in written format and has expressed an understanding of the discharge instructions. The patient is aware that any significant change in condition or worsening of symptoms should prompt an immediate return to this or the closest emergency department or call to 911.  I have explained discharge medications and the need for follow up with the patient/caretakers. This was also printed in the discharge instructions. Patient was discharged with the following medications and follow up:      Medication List        New Prescriptions      cephalexin 500 MG capsule  Commonly known as: KEFLEX  Take 1 capsule by mouth 3 (Three) Times a Day.     traMADol 50 MG tablet  Commonly known as: ULTRAM  Take 1 tablet by mouth Every 6 (Six) Hours As Needed for Moderate Pain.               Where to Get Your Medications        These medications were sent to [facility], [place] [address] [facility] [phone] [facility] [phone] [address] [place] [address] [address] [place] [address]      Phone: [phone]   cephalexin 500 MG capsule  traMADol 50 MG tablet      Tierra Askew APRN  [address]  [place] [id number] [phone]    In 2 days         Final diagnoses:   Epididymitis        ED Disposition       ED Disposition   Discharge    Condition    Stable    Comment   --               This medical record created using voice recognition software.             Jordan Goss MD  04/08/25 [id number]

## 2025-04-11 ENCOUNTER — OFFICE VISIT (OUTPATIENT)
Dept: INTERNAL MEDICINE | Facility: CLINIC | Age: 40
End: 2025-04-11
Payer: MEDICAID

## 2025-04-11 VITALS
RESPIRATION RATE: 13 BRPM | TEMPERATURE: 96 F | DIASTOLIC BLOOD PRESSURE: 78 MMHG | WEIGHT: 240.8 LBS | BODY MASS INDEX: 32.66 KG/M2 | SYSTOLIC BLOOD PRESSURE: 116 MMHG | OXYGEN SATURATION: 99 % | HEART RATE: 73 BPM

## 2025-04-11 DIAGNOSIS — E11.65 TYPE 2 DIABETES MELLITUS WITH HYPERGLYCEMIA, WITHOUT LONG-TERM CURRENT USE OF INSULIN: Primary | ICD-10-CM

## 2025-04-11 DIAGNOSIS — I10 ESSENTIAL HYPERTENSION: ICD-10-CM

## 2025-04-11 DIAGNOSIS — E78.5 HYPERLIPIDEMIA, UNSPECIFIED HYPERLIPIDEMIA TYPE: ICD-10-CM

## 2025-04-11 LAB
ALBUMIN SERPL-MCNC: 4.5 G/DL (ref 3.5–5.2)
ALBUMIN/GLOB SERPL: 1.7 G/DL
ALP SERPL-CCNC: 64 U/L (ref 39–117)
ALT SERPL W P-5'-P-CCNC: 26 U/L (ref 1–41)
ANION GAP SERPL CALCULATED.3IONS-SCNC: 12 MMOL/L (ref 5–15)
AST SERPL-CCNC: 17 U/L (ref 1–40)
BASOPHILS # BLD AUTO: 0.05 10*3/MM3 (ref 0–0.2)
BASOPHILS NFR BLD AUTO: 0.7 % (ref 0–1.5)
BILIRUB SERPL-MCNC: 0.9 MG/DL (ref 0–1.2)
BUN SERPL-MCNC: 10 MG/DL (ref 6–20)
BUN/CREAT SERPL: 9 (ref 7–25)
CALCIUM SPEC-SCNC: 9.8 MG/DL (ref 8.6–10.5)
CHLORIDE SERPL-SCNC: 101 MMOL/L (ref 98–107)
CHOLEST SERPL-MCNC: 124 MG/DL (ref 0–200)
CO2 SERPL-SCNC: 25 MMOL/L (ref 22–29)
CREAT SERPL-MCNC: 1.11 MG/DL (ref 0.76–1.27)
DEPRECATED RDW RBC AUTO: 39.4 FL (ref 37–54)
EGFRCR SERPLBLD CKD-EPI 2021: 87.7 ML/MIN/1.73
EOSINOPHIL # BLD AUTO: 0.14 10*3/MM3 (ref 0–0.4)
EOSINOPHIL NFR BLD AUTO: 1.9 % (ref 0.3–6.2)
ERYTHROCYTE [DISTWIDTH] IN BLOOD BY AUTOMATED COUNT: 12.6 % (ref 12.3–15.4)
GLOBULIN UR ELPH-MCNC: 2.7 GM/DL
GLUCOSE SERPL-MCNC: 97 MG/DL (ref 65–99)
HBA1C MFR BLD: 6.7 % (ref 4.8–5.6)
HCT VFR BLD AUTO: 47.5 % (ref 37.5–51)
HDLC SERPL-MCNC: 22 MG/DL (ref 40–60)
HGB BLD-MCNC: 16.1 G/DL (ref 13–17.7)
IMM GRANULOCYTES # BLD AUTO: 0.02 10*3/MM3 (ref 0–0.05)
IMM GRANULOCYTES NFR BLD AUTO: 0.3 % (ref 0–0.5)
LDLC SERPL CALC-MCNC: 73 MG/DL (ref 0–100)
LDLC/HDLC SERPL: 3.15 {RATIO}
LYMPHOCYTES # BLD AUTO: 2.39 10*3/MM3 (ref 0.7–3.1)
LYMPHOCYTES NFR BLD AUTO: 31.7 % (ref 19.6–45.3)
MCH RBC QN AUTO: 29.4 PG (ref 26.6–33)
MCHC RBC AUTO-ENTMCNC: 33.9 G/DL (ref 31.5–35.7)
MCV RBC AUTO: 86.8 FL (ref 79–97)
MONOCYTES # BLD AUTO: 0.49 10*3/MM3 (ref 0.1–0.9)
MONOCYTES NFR BLD AUTO: 6.5 % (ref 5–12)
NEUTROPHILS NFR BLD AUTO: 4.44 10*3/MM3 (ref 1.7–7)
NEUTROPHILS NFR BLD AUTO: 58.9 % (ref 42.7–76)
NRBC BLD AUTO-RTO: 0 /100 WBC (ref 0–0.2)
PLATELET # BLD AUTO: 218 10*3/MM3 (ref 140–450)
PMV BLD AUTO: 11.6 FL (ref 6–12)
POTASSIUM SERPL-SCNC: 4.2 MMOL/L (ref 3.5–5.2)
PROT SERPL-MCNC: 7.2 G/DL (ref 6–8.5)
RBC # BLD AUTO: 5.47 10*6/MM3 (ref 4.14–5.8)
SODIUM SERPL-SCNC: 138 MMOL/L (ref 136–145)
TRIGL SERPL-MCNC: 164 MG/DL (ref 0–150)
TSH SERPL DL<=0.05 MIU/L-ACNC: 4.73 UIU/ML (ref 0.27–4.2)
VLDLC SERPL-MCNC: 29 MG/DL (ref 5–40)
WBC NRBC COR # BLD AUTO: 7.53 10*3/MM3 (ref 3.4–10.8)

## 2025-04-11 PROCEDURE — 84443 ASSAY THYROID STIM HORMONE: CPT | Performed by: INTERNAL MEDICINE

## 2025-04-11 PROCEDURE — 80053 COMPREHEN METABOLIC PANEL: CPT | Performed by: INTERNAL MEDICINE

## 2025-04-11 PROCEDURE — 83036 HEMOGLOBIN GLYCOSYLATED A1C: CPT | Performed by: INTERNAL MEDICINE

## 2025-04-11 PROCEDURE — 80061 LIPID PANEL: CPT | Performed by: INTERNAL MEDICINE

## 2025-04-11 PROCEDURE — 85025 COMPLETE CBC W/AUTO DIFF WBC: CPT | Performed by: INTERNAL MEDICINE

## 2025-04-11 RX ORDER — ATORVASTATIN CALCIUM 40 MG/1
40 TABLET, FILM COATED ORAL DAILY
Qty: 90 TABLET | Refills: 1 | Status: SHIPPED | OUTPATIENT
Start: 2025-04-11

## 2025-04-11 RX ORDER — LISINOPRIL 20 MG/1
20 TABLET ORAL DAILY
Qty: 90 TABLET | Refills: 0 | Status: SHIPPED | OUTPATIENT
Start: 2025-04-11

## 2025-04-11 NOTE — PROGRESS NOTES
Chief Complaint  Diabetes (6 month follow up) and Med Refill    Subjective      Matt Albarran is a 39 y.o. male who presents to [facility]     Presenting for follow-up    DM: well controlled on last labs, A1C 6.8%, denies numbness/tingling, vision changes, urinary changes, dizziness, nausea    HTN:  well controlled today, doing well on medication, denies headache, chest pain, dizziness, vision changes    HLD: on statin, tolerating well, denies muscle pain/weakness      Objective   Vital Signs:   Vitals:    04/11/25 0940   BP: 116/78   BP Location: Left arm   Patient Position: Sitting   Cuff Size: Adult   Pulse: 73   Resp: 13   Temp: 96 °F (35.6 °C)   TempSrc: Temporal   SpO2: 99%   Weight: 109 kg (240 lb 12.8 oz)     Body mass index is 32.66 kg/m².    Wt Readings from Last 3 Encounters:   04/11/25 109 kg (240 lb 12.8 oz)   04/08/25 110 kg (243 lb 6.2 oz)   03/10/25 114 kg (252 lb 4.8 oz)     BP Readings from Last 3 Encounters:   04/11/25 116/78   04/08/25 138/91   03/10/25 161/98       Health Maintenance   Topic Date Due    Hepatitis B (1 of 3 - 19+ 3-dose series) Never done    URINE MICROALBUMIN-CREATININE RATIO (uACR)  03/30/2022    COVID-19 Vaccine (1 - 2024-25 season) Never done    DIABETIC EYE EXAM  12/01/2024    HEMOGLOBIN A1C  03/17/2025    ANNUAL PHYSICAL  03/18/2025    DIABETIC FOOT EXAM  03/18/2025    INFLUENZA VACCINE  07/01/2025    LIPID PANEL  09/17/2025    TDAP/TD VACCINES (4 - Td or Tdap) 09/16/2032    HEPATITIS C SCREENING  Completed    Pneumococcal Vaccine 0-49  Completed       Physical Exam  Vitals reviewed.   Constitutional:       Appearance: Normal appearance. He is well-developed.   HENT:      Head: Normocephalic and atraumatic.      Mouth/Throat:      Pharynx: No oropharyngeal exudate.   Eyes:      Conjunctiva/sclera: Conjunctivae normal.      Pupils: Pupils are equal, round, and reactive to light.   Neck:      Thyroid: No thyromegaly or  thyroid tenderness.   Cardiovascular:      Rate and Rhythm: Normal rate and regular rhythm.      Heart sounds: No murmur heard.     No friction rub. No gallop.   Pulmonary:      Effort: Pulmonary effort is normal.      Breath sounds: Normal breath sounds. No wheezing or rhonchi.   Lymphadenopathy:      Cervical: No cervical adenopathy.   Skin:     General: Skin is warm and dry.   Neurological:      Mental Status: He is alert and oriented to person, place, and time.   Psychiatric:         Mood and Affect: Affect normal.          Result Review :  The following data was reviewed by: Camille Butt MD on 04/11/2025:  CMP          9/17/2024    08:13   CMP   Glucose 127    BUN 17    Creatinine 0.93    EGFR 109.1    Sodium 136    Potassium 4.5    Chloride 99    Calcium 9.8    Total Protein 7.0    Albumin 4.6    Globulin 2.4    Total Bilirubin 1.1    Alkaline Phosphatase 62    AST (SGOT) 19    ALT (SGPT) 35    Albumin/Globulin Ratio 1.9    BUN/Creatinine Ratio 18.3    Anion Gap 11.5      CBC w/diff          9/17/2024    08:13   CBC w/Diff   WBC 6.07    RBC 5.42    Hemoglobin 16.0    Hematocrit 47.1    MCV 86.9    MCH 29.5    MCHC 34.0    RDW 13.2    Platelets 197    Neutrophil Rel % 67.0    Immature Granulocyte Rel % 0.2    Lymphocyte Rel % 24.5    Monocyte Rel % 6.1    Eosinophil Rel % 1.2    Basophil Rel % 1.0      Lipid Panel          9/17/2024    08:13   Lipid Panel   Total Cholesterol 143    Triglycerides 208    HDL Cholesterol 25    VLDL Cholesterol 35    LDL Cholesterol  83    LDL/HDL Ratio 3.06      TSH          9/17/2024    08:13   TSH   TSH 2.180      A1C Last 3 Results          9/17/2024    08:13   HGBA1C Last 3 Results   Hemoglobin A1C 6.80             Procedures          Assessment & Plan  Type 2 diabetes mellitus with hyperglycemia, without long-term current use of insulin  Well-controlled on last labs.  Checking follow-up labs today  Continue current management    Orders:    CBC & Differential     Comprehensive Metabolic Panel    Hemoglobin A1c    Lipid Panel    TSH    Microalbumin / Creatinine Urine Ratio - Urine, Clean Catch    Essential hypertension  BP well controlled today in clinic  Continue current management         Hyperlipidemia, unspecified hyperlipidemia type  On statin, tolerating well  Checking follow-up labs today              FOLLOW UP  Return in about 6 months (around 10/11/2025) for Next scheduled follow up.  Patient was given instructions and counseling regarding his condition or for health maintenance advice. Please see specific information pulled into the AVS if appropriate.       Camille Butt MD  04/11/25  10:28 EDT    CURRENT & DISCONTINUED MEDICATIONS  Current Outpatient Medications   Medication Instructions    atorvastatin (LIPITOR) 40 mg, Oral, Daily    cephalexin (KEFLEX) 500 mg, Oral, 3 Times Daily    empagliflozin (JARDIANCE) 25 mg, Oral, Daily    glucose blood test strip 1 each, Other, 2 Times Daily, Use as instructed    lisinopril (PRINIVIL,ZESTRIL) 20 mg, Oral, Daily    metFORMIN (GLUCOPHAGE) 1,000 mg, Oral, 2 Times Daily With Meals    traMADol (ULTRAM) 50 mg, Oral, Every 6 Hours PRN       Medications Discontinued During This Encounter   Medication Reason    atorvastatin (LIPITOR) 40 MG tablet Reorder    lisinopril (PRINIVIL,ZESTRIL) 20 MG tablet Reorder    empagliflozin (Jardiance) 25 MG tablet tablet Reorder    metFORMIN (GLUCOPHAGE) 1000 MG tablet Reorder

## 2025-04-11 NOTE — ASSESSMENT & PLAN NOTE
Well-controlled on last labs.  Checking follow-up labs today  Continue current management    Orders:    CBC & Differential    Comprehensive Metabolic Panel    Hemoglobin A1c    Lipid Panel    TSH    Microalbumin / Creatinine Urine Ratio - Urine, Clean Catch

## 2025-04-25 ENCOUNTER — TELEPHONE (OUTPATIENT)
Dept: INTERNAL MEDICINE | Facility: CLINIC | Age: 40
End: 2025-04-25
Payer: MEDICAID

## 2025-04-25 NOTE — TELEPHONE ENCOUNTER
Caller: Matt Albarran    Relationship: Self    Best call back number:     [phone]      What is the medical concern/diagnosis: ANXIETY    What specialty or service is being requested: THERAPY    Any additional details: PATIENT STATES THAT [facility] HAS TALKED WITH HIM IN THE PAST ABOUT SENDING HIM TO THERAPY AND HE IS READY TO GO.     HE STATES THAT HIS WIFE IS LEAVING HIM AND HE IS HAVING A LOT OF ANXIETY SURROUNDING THE SITUATION.     HE STATES THAT HE WOULD LIKE A LIST OF THERAPISTS THAT ACCEPT HIS INSURANCE AND THAT YOU CAN CALL HIM OR MESSAGE HIM THROUGH [employer].

## 2025-07-06 ENCOUNTER — PATIENT ROUNDING (BHMG ONLY) (OUTPATIENT)
Dept: URGENT CARE | Facility: CLINIC | Age: 40
End: 2025-07-06
Payer: MEDICAID

## 2025-07-06 NOTE — ED NOTES
Thank you for letting us care for you in your recent visit to our urgent care center. We would love to hear about your experience with us. Was this the first time you have visited our location?     We’re always looking for ways to make our patients’ experiences even better. Do you have any recommendations on ways we may improve?     I appreciate you taking the time to respond. Please be on the lookout for a survey about your recent visit from [employer] via text or email. We would greatly appreciate if you could fill that out and turn it back in. We want your voice to be heard and we value your feedback.     Thank you for choosing [facility] for your healthcare needs.

## 2025-08-14 ENCOUNTER — OFFICE VISIT (OUTPATIENT)
Dept: INTERNAL MEDICINE | Facility: CLINIC | Age: 40
End: 2025-08-14
Payer: MEDICAID

## 2025-08-14 ENCOUNTER — TELEPHONE (OUTPATIENT)
Dept: INTERNAL MEDICINE | Facility: CLINIC | Age: 40
End: 2025-08-14
Payer: MEDICAID

## 2025-08-14 VITALS
HEIGHT: 72 IN | BODY MASS INDEX: 30.58 KG/M2 | HEART RATE: 101 BPM | DIASTOLIC BLOOD PRESSURE: 70 MMHG | SYSTOLIC BLOOD PRESSURE: 110 MMHG | TEMPERATURE: 96.1 F | WEIGHT: 225.8 LBS | RESPIRATION RATE: 14 BRPM | OXYGEN SATURATION: 99 %

## 2025-08-14 DIAGNOSIS — Z79.899 MEDICATION MANAGEMENT: ICD-10-CM

## 2025-08-14 DIAGNOSIS — N48.1 BALANITIS: Primary | ICD-10-CM

## 2025-08-14 DIAGNOSIS — E11.65 TYPE 2 DIABETES MELLITUS WITH HYPERGLYCEMIA, WITHOUT LONG-TERM CURRENT USE OF INSULIN: ICD-10-CM

## 2025-08-14 DIAGNOSIS — I10 ESSENTIAL HYPERTENSION: ICD-10-CM

## 2025-08-14 PROCEDURE — 3044F HG A1C LEVEL LT 7.0%: CPT | Performed by: NURSE PRACTITIONER

## 2025-08-14 PROCEDURE — 1126F AMNT PAIN NOTED NONE PRSNT: CPT | Performed by: NURSE PRACTITIONER

## 2025-08-14 PROCEDURE — 3074F SYST BP LT 130 MM HG: CPT | Performed by: NURSE PRACTITIONER

## 2025-08-14 PROCEDURE — 3078F DIAST BP <80 MM HG: CPT | Performed by: NURSE PRACTITIONER

## 2025-08-14 PROCEDURE — 99214 OFFICE O/P EST MOD 30 MIN: CPT | Performed by: NURSE PRACTITIONER

## 2025-08-14 RX ORDER — SEMAGLUTIDE 1.34 MG/ML
0.25 INJECTION, SOLUTION SUBCUTANEOUS WEEKLY
Qty: 1.5 ML | Refills: 0 | COMMUNITY
Start: 2025-08-14

## 2025-08-14 RX ORDER — SEMAGLUTIDE 0.68 MG/ML
0.5 INJECTION, SOLUTION SUBCUTANEOUS WEEKLY
Qty: 3 ML | Refills: 0 | Status: SHIPPED | OUTPATIENT
Start: 2025-08-14

## 2025-08-14 RX ORDER — CLOTRIMAZOLE 1 G/ML
SOLUTION TOPICAL 2 TIMES DAILY
Qty: 60 ML | Refills: 1 | Status: SHIPPED | OUTPATIENT
Start: 2025-08-14 | End: 2025-08-18

## 2025-08-18 RX ORDER — CLOTRIMAZOLE 1 %
1 CREAM (GRAM) TOPICAL 2 TIMES DAILY
Qty: 28 G | Refills: 0 | Status: SHIPPED | OUTPATIENT
Start: 2025-08-18